# Patient Record
Sex: FEMALE | Race: WHITE | NOT HISPANIC OR LATINO | Employment: OTHER | URBAN - METROPOLITAN AREA
[De-identification: names, ages, dates, MRNs, and addresses within clinical notes are randomized per-mention and may not be internally consistent; named-entity substitution may affect disease eponyms.]

---

## 2017-08-21 ENCOUNTER — ANESTHESIA EVENT (OUTPATIENT)
Dept: GASTROENTEROLOGY | Facility: AMBULARY SURGERY CENTER | Age: 67
End: 2017-08-21
Payer: COMMERCIAL

## 2017-08-21 RX ORDER — BISOPROLOL FUMARATE AND HYDROCHLOROTHIAZIDE 2.5; 6.25 MG/1; MG/1
1 TABLET ORAL EVERY MORNING
COMMUNITY

## 2017-08-21 RX ORDER — ALPRAZOLAM 1 MG/1
TABLET ORAL
COMMUNITY

## 2017-08-21 RX ORDER — MELOXICAM 15 MG/1
15 TABLET ORAL EVERY MORNING
COMMUNITY

## 2017-08-21 RX ORDER — OMEPRAZOLE 40 MG/1
40 CAPSULE, DELAYED RELEASE ORAL EVERY MORNING
COMMUNITY

## 2017-08-22 ENCOUNTER — ANESTHESIA (OUTPATIENT)
Dept: GASTROENTEROLOGY | Facility: AMBULARY SURGERY CENTER | Age: 67
End: 2017-08-22
Payer: COMMERCIAL

## 2017-08-22 ENCOUNTER — HOSPITAL ENCOUNTER (OUTPATIENT)
Facility: AMBULARY SURGERY CENTER | Age: 67
Setting detail: OUTPATIENT SURGERY
Discharge: HOME | End: 2017-08-22
Attending: INTERNAL MEDICINE | Admitting: INTERNAL MEDICINE
Payer: COMMERCIAL

## 2017-08-22 VITALS
HEIGHT: 62 IN | WEIGHT: 180 LBS | OXYGEN SATURATION: 100 % | DIASTOLIC BLOOD PRESSURE: 70 MMHG | SYSTOLIC BLOOD PRESSURE: 150 MMHG | TEMPERATURE: 98.4 F | RESPIRATION RATE: 18 BRPM | BODY MASS INDEX: 33.13 KG/M2 | HEART RATE: 66 BPM

## 2017-08-22 DIAGNOSIS — K21.9 GASTRO-ESOPHAGEAL REFLUX DISEASE WITHOUT ESOPHAGITIS: ICD-10-CM

## 2017-08-22 DIAGNOSIS — K62.5 HEMORRHAGE OF ANUS AND RECTUM: ICD-10-CM

## 2017-08-22 DIAGNOSIS — Z80.0 FAMILY HISTORY OF MALIGNANT NEOPLASM OF DIGESTIVE ORGAN: ICD-10-CM

## 2017-08-22 DIAGNOSIS — R13.10 DYSPHAGIA: ICD-10-CM

## 2017-08-22 PROCEDURE — 88313 SPECIAL STAINS GROUP 2: CPT | Performed by: INTERNAL MEDICINE

## 2017-08-22 PROCEDURE — 88342 IMHCHEM/IMCYTCHM 1ST ANTB: CPT | Performed by: INTERNAL MEDICINE

## 2017-08-22 PROCEDURE — 88305 TISSUE EXAM BY PATHOLOGIST: CPT | Performed by: INTERNAL MEDICINE

## 2017-08-22 RX ORDER — SODIUM CHLORIDE, SODIUM LACTATE, POTASSIUM CHLORIDE, CALCIUM CHLORIDE 600; 310; 30; 20 MG/100ML; MG/100ML; MG/100ML; MG/100ML
INJECTION, SOLUTION INTRAVENOUS CONTINUOUS PRN
Status: DISCONTINUED | OUTPATIENT
Start: 2017-08-22 | End: 2017-08-22 | Stop reason: SURG

## 2017-08-22 RX ORDER — CLOTRIMAZOLE AND BETAMETHASONE DIPROPIONATE 10; .64 MG/G; MG/G
1 CREAM TOPICAL 2 TIMES DAILY
Status: DISCONTINUED | OUTPATIENT
Start: 2017-08-22 | End: 2017-08-22 | Stop reason: HOSPADM

## 2017-08-22 RX ORDER — PROPOFOL 10 MG/ML
INJECTION, EMULSION INTRAVENOUS CONTINUOUS PRN
Status: DISCONTINUED | OUTPATIENT
Start: 2017-08-22 | End: 2017-08-22 | Stop reason: SURG

## 2017-08-22 RX ORDER — GLYCOPYRROLATE 0.2 MG/ML
INJECTION INTRAMUSCULAR; INTRAVENOUS AS NEEDED
Status: DISCONTINUED | OUTPATIENT
Start: 2017-08-22 | End: 2017-08-22 | Stop reason: SURG

## 2017-08-22 RX ORDER — SODIUM CHLORIDE, SODIUM LACTATE, POTASSIUM CHLORIDE, CALCIUM CHLORIDE 600; 310; 30; 20 MG/100ML; MG/100ML; MG/100ML; MG/100ML
125 INJECTION, SOLUTION INTRAVENOUS CONTINUOUS
Status: DISCONTINUED | OUTPATIENT
Start: 2017-08-22 | End: 2017-08-22 | Stop reason: HOSPADM

## 2017-08-22 RX ORDER — PROPOFOL 10 MG/ML
INJECTION, EMULSION INTRAVENOUS AS NEEDED
Status: DISCONTINUED | OUTPATIENT
Start: 2017-08-22 | End: 2017-08-22 | Stop reason: SURG

## 2017-08-22 RX ADMIN — GLYCOPYRROLATE 0.1 MG: 0.2 INJECTION, SOLUTION INTRAMUSCULAR; INTRAVENOUS at 10:53

## 2017-08-22 RX ADMIN — LIDOCAINE HYDROCHLORIDE 100 MG: 20 INJECTION, SOLUTION INTRAVENOUS at 10:31

## 2017-08-22 RX ADMIN — PROPOFOL 40 MG: 10 INJECTION, EMULSION INTRAVENOUS at 10:32

## 2017-08-22 RX ADMIN — GLYCOPYRROLATE 0.1 MG: 0.2 INJECTION, SOLUTION INTRAMUSCULAR; INTRAVENOUS at 10:46

## 2017-08-22 RX ADMIN — PROPOFOL 60 MG: 10 INJECTION, EMULSION INTRAVENOUS at 10:31

## 2017-08-22 RX ADMIN — PROPOFOL 120 MCG/KG/MIN: 10 INJECTION, EMULSION INTRAVENOUS at 10:31

## 2017-08-22 RX ADMIN — SODIUM CHLORIDE, SODIUM LACTATE, POTASSIUM CHLORIDE, AND CALCIUM CHLORIDE: .6; .31; .03; .02 INJECTION, SOLUTION INTRAVENOUS at 10:27

## 2017-10-24 ENCOUNTER — ALLSCRIPTS OFFICE VISIT (OUTPATIENT)
Dept: OTHER | Facility: OTHER | Age: 67
End: 2017-10-24

## 2017-10-24 ENCOUNTER — GENERIC CONVERSION - ENCOUNTER (OUTPATIENT)
Dept: OTHER | Facility: OTHER | Age: 67
End: 2017-10-24

## 2017-10-24 DIAGNOSIS — Z12.31 ENCOUNTER FOR SCREENING MAMMOGRAM FOR MALIGNANT NEOPLASM OF BREAST: ICD-10-CM

## 2018-01-22 VITALS
WEIGHT: 181 LBS | SYSTOLIC BLOOD PRESSURE: 130 MMHG | DIASTOLIC BLOOD PRESSURE: 80 MMHG | HEIGHT: 62 IN | BODY MASS INDEX: 33.31 KG/M2

## 2021-02-13 DIAGNOSIS — Z23 ENCOUNTER FOR IMMUNIZATION: ICD-10-CM

## 2021-04-20 ENCOUNTER — OFFICE VISIT (OUTPATIENT)
Dept: GASTROENTEROLOGY | Facility: CLINIC | Age: 71
End: 2021-04-20
Payer: COMMERCIAL

## 2021-04-20 ENCOUNTER — TELEPHONE (OUTPATIENT)
Dept: GASTROENTEROLOGY | Facility: CLINIC | Age: 71
End: 2021-04-20

## 2021-04-20 VITALS
HEIGHT: 61 IN | SYSTOLIC BLOOD PRESSURE: 88 MMHG | DIASTOLIC BLOOD PRESSURE: 52 MMHG | HEART RATE: 73 BPM | BODY MASS INDEX: 33.04 KG/M2 | WEIGHT: 175 LBS

## 2021-04-20 DIAGNOSIS — K25.7 CHRONIC GASTRIC ULCER WITHOUT HEMORRHAGE AND WITHOUT PERFORATION: ICD-10-CM

## 2021-04-20 DIAGNOSIS — Z87.11 HISTORY OF GASTRIC ULCER: ICD-10-CM

## 2021-04-20 DIAGNOSIS — K59.01 SLOW TRANSIT CONSTIPATION: ICD-10-CM

## 2021-04-20 DIAGNOSIS — K64.9 HEMORRHOIDS, UNSPECIFIED HEMORRHOID TYPE: ICD-10-CM

## 2021-04-20 DIAGNOSIS — Z79.1 NSAID LONG-TERM USE: ICD-10-CM

## 2021-04-20 DIAGNOSIS — K62.5 RECTAL BLEEDING: Primary | ICD-10-CM

## 2021-04-20 DIAGNOSIS — Z86.010 HISTORY OF COLON POLYPS: ICD-10-CM

## 2021-04-20 PROCEDURE — 99244 OFF/OP CNSLTJ NEW/EST MOD 40: CPT | Performed by: INTERNAL MEDICINE

## 2021-04-20 RX ORDER — HYDROCORTISONE ACETATE 25 MG/1
25 SUPPOSITORY RECTAL 2 TIMES DAILY
Qty: 12 SUPPOSITORY | Refills: 0 | Status: SHIPPED | OUTPATIENT
Start: 2021-04-20

## 2021-04-20 RX ORDER — MECLIZINE HYDROCHLORIDE 25 MG/1
25 TABLET ORAL 3 TIMES DAILY PRN
COMMUNITY
Start: 2021-03-04

## 2021-04-20 RX ORDER — DOCUSATE SODIUM 100 MG/1
100 CAPSULE, LIQUID FILLED ORAL 2 TIMES DAILY
Qty: 10 CAPSULE | Refills: 0 | Status: SHIPPED | OUTPATIENT
Start: 2021-04-20

## 2021-04-20 RX ORDER — ATORVASTATIN CALCIUM 40 MG/1
40 TABLET, FILM COATED ORAL DAILY
COMMUNITY
Start: 2021-03-04

## 2021-04-20 NOTE — PATIENT INSTRUCTIONS
High Fiber Diet   AMBULATORY CARE:   A high-fiber diet  includes foods that have a high amount of fiber  Fiber is the part of fruits, vegetables, and grains that is not broken down by your body  Fiber keeps your bowel movements regular  Fiber can also help lower your cholesterol level, control blood sugar in people with diabetes, and relieve constipation  Fiber can also help you control your weight because it helps you feel full faster  Most adults should eat 25 to 35 grams of fiber each day  Talk to your dietitian or healthcare provider about the amount of fiber you need  Good sources of fiber:       · Foods with at least 4 grams of fiber per serving:      ? ? to ½ cup of high-fiber cereal (check the nutrition label on the box)    ? ½ cup of blackberries or raspberries    ? 4 dried prunes    ? 1 cooked artichoke    ? ½ cup of cooked legumes, such as lentils, or red, kidney, and sweeney beans    · Foods with 1 to 3 grams of fiber per serving:      ? 1 slice of whole-wheat, pumpernickel, or rye bread    ? ½ cup of cooked brown rice    ? 4 whole-wheat crackers    ? 1 cup of oatmeal    ? ½ cup of cereal with 1 to 3 grams of fiber per serving (check the nutrition label on the box)    ? 1 small piece of fruit, such as an apple, banana, pear, kiwi, or orange    ? 3 dates    ? ½ cup of canned apricots, fruit cocktail, peaches, or pears    ? ½ cup of raw or cooked vegetables, such as carrots, cauliflower, cabbage, spinach, squash, or corn  Ways that you can increase fiber in your diet:   · Choose brown or wild rice instead of white rice  · Use whole wheat flour in recipes instead of white or all-purpose flour  · Add beans and peas to casseroles or soups  · Choose fresh fruit and vegetables with peels or skins on instead of juices  Other diet guidelines to follow:   · Add fiber to your diet slowly  You may have abdominal discomfort, bloating, and gas if you add fiber to your diet too quickly       · Drink plenty of liquids as you add fiber to your diet  You may have nausea or develop constipation if you do not drink enough water  Ask how much liquid to drink each day and which liquids are best for you  © Copyright [address] Information is for End User's use only and may not be sold, redistributed or otherwise used for commercial purposes  All illustrations and images included in CareNotes® are the copyrighted property of A D A M , Inc  or [facility] Ofe Borges   The above information is an  only  It is not intended as medical advice for individual conditions or treatments  Talk to your doctor, nurse or pharmacist before following any medical regimen to see if it is safe and effective for you

## 2021-04-20 NOTE — PROGRESS NOTES
[facility] - Outpatient Consultation  James Hu 78 y o  female MRN: [id number]  Encounter: [id number]          ASSESSMENT AND PLAN:      1  Rectal bleeding   patient is having intermittent bright red bleeding per rectum  She is also constipated  She has some discomfort in the anal area  She denies any mucus per rectum  She denies any changes in  Her eating pattern  Her diet is poor in fiber  I have given her advised to eat high-fiber diet  Her bleeding is most likely from anorectal area from hemorrhoid  Her last colonoscopy was four years ago  Patient has history of adenomatous colon polyp  Patient advised colonoscopy for evaluation however she does not want to get that done and wants to try to get treated for hemorrhoidal of disease  Hemorrhoidal cream and stool softener has been prescribed  She will come back in 6-8 weeks  At that time hemorrhoidal banding will be considered  Colonoscopy will also be considered  - docusate sodium (COLACE) 100 mg capsule; Take 1 capsule (100 mg total) by mouth 2 (two) times a day  Dispense: 10 capsule; Refill: 0  - hydrocortisone (ANUSOL-HC) 25 mg suppository; Insert 1 suppository (25 mg total) into the rectum 2 (two) times a day  Dispense: 12 suppository; Refill: 0    2  Slow transit constipation    Patient appears to have significant constipation with change in bowel habits she denies any narcotic use age  She does not have any dietary changes recently  She does not want to get colonoscopy at this time  I will order docusate sodium  She will return in 6-8 weeks  - docusate sodium (COLACE) 100 mg capsule; Take 1 capsule (100 mg total) by mouth 2 (two) times a day  Dispense: 10 capsule; Refill: 0  - hydrocortisone (ANUSOL-HC) 25 mg suppository; Insert 1 suppository (25 mg total) into the rectum 2 (two) times a day  Dispense: 12 suppository; Refill: 0    3  History of colon polyps    Patient has history of colon polyp  Last colonoscopy four years ago with adenomatous polyp  She also has history of gastric ulcer  Patient takes nonsteroidal anti-inflammatory drugs on a off and on basis  Currently she is taking proton pump inhibitor  I have advised her to try to decrease the intake of NSAIDs and PPI  4  Chronic gastric ulcer without hemorrhage and without perforation    See above discussion  She has history of gastric ulcer diagnosed during last endoscopy four years ago  She has not had any upper endoscopy done since then  I have advised patient to undergo upper endoscopy  She does not want to get endoscopic evaluation at this time  She will see me again in 6-8 weeks  At that time will also does discuss the same  5  History of gastric ulcer    History of gastric ulcer  See above discussion    6  NSAID long-term use   see above discussion  Patient advised not to use NSAIDs  She can take Tylenol  She has history of generalized muscle aches and pain  I have advised to follow with her primary care  She may get evaluation for possible fibromyalgia  7  Hemorrhoids, unspecified hemorrhoid type    Patient will return in six weeks  Hemorrhoidal cream prescribed  Anoscopy and hemorrhoidal banding will be considered  ______________________________________________________________________    HPI:    Constipation and rectal bleeding  There is no abdominal pain or discomfort  Some rectal discomfort has been present  She does not have any significant reflux  She denies upper abdominal pain  REVIEW OF SYSTEMS:    CONSTITUTIONAL: Denies any fever, chills, rigors, and weight loss  HEENT: No earache or tinnitus  Denies hearing loss or visual disturbances  CARDIOVASCULAR: No chest pain or palpitations  RESPIRATORY: Denies any cough, hemoptysis, shortness of breath or dyspnea on exertion  GASTROINTESTINAL: As noted in the History of Present Illness  GENITOURINARY: No problems with urination   Denies any hematuria or dysuria  NEUROLOGIC: No dizziness or vertigo, denies headaches  MUSCULOSKELETAL: Denies any muscle or joint pain  SKIN: Denies skin rashes or itching  ENDOCRINE: Denies excessive thirst  Denies intolerance to heat or cold  PSYCHOSOCIAL: Denies depression or anxiety  Denies any recent memory loss  Historical Information   Past Medical History:   Diagnosis Date    A-fib [facility])     since epidural DCed pts been ok    Anxiety     GERD (gastroesophageal reflux disease)     Hyperlipidemia     Hypertension      Past Surgical History:   Procedure Laterality Date    BREAST SURGERY Left     bx benign    COLONOSCOPY      DILATION AND CURETTAGE OF UTERUS      JOINT REPLACEMENT Bilateral 2015    knees    [place] COLSC FLX W/RMVL OF TUMOR POLYP LESION SNARE TQ N/A 8/22/2017    Procedure: COLONOSCOPY and biopsy ;  Surgeon: Javier Schroeder MD;  Location: [facility] GI LAB; Service: Gastroenterology    [place] EGD TRANSORAL BIOPSY SINGLE/MULTIPLE N/A 8/22/2017    Procedure: ESOPHAGOGASTRODUODENOSCOPY (EGD) and biopsy ;  Surgeon: Javier Schroeder MD;  Location: [facility] GI LAB;   Service: Gastroenterology    REPLACEMENT TOTAL KNEE BILATERAL      TONSILLECTOMY      TUBAL LIGATION  1984     Social History   Social History     Substance and Sexual Activity   Alcohol Use Yes    Comment: socially     Social History     Substance and Sexual Activity   Drug Use No     Social History     Tobacco Use   Smoking Status Never Smoker   Smokeless Tobacco Never Used     Family History   Problem Relation Age of Onset    Cancer Mother         melanoma    Heart disease Father     Cancer Brother         pancreatic    No Known Problems Daughter     No Known Problems Son     Cancer Maternal Grandfather     No Known Problems Brother        Meds/Allergies       Current Outpatient Medications:     ALPRAZolam (XANAX) 1 mg tablet    aspirin (ECOTRIN) 325 mg EC tablet    atorvastatin (LIPITOR) 40 mg tablet   bisoprolol-hydrochlorothiazide (ZIAC) 2 5-6 25 MG per tablet    meclizine (ANTIVERT) 25 mg tablet    omeprazole (PriLOSEC) 40 MG capsule    docusate sodium (COLACE) 100 mg capsule    hydrocortisone (ANUSOL-HC) 25 mg suppository    meloxicam (MOBIC) 15 mg tablet    Allergies   Allergen Reactions    Doxycycline GI Intolerance    Tramadol GI Intolerance     Dizzy    Other Rash     Cat gut suture           Objective     Blood pressure (!) 88/52, pulse 73, height 5' 1" (1 549 m), weight 79 4 kg (175 lb)  Body mass index is 33 07 kg/m²  PHYSICAL EXAM:      General Appearance:   Alert, cooperative, no distress   HEENT:   Normocephalic, atraumatic, anicteric      Neck:  Supple, symmetrical, trachea midline   Lungs:   Clear to auscultation bilaterally; no rales, rhonchi or wheezing; respirations unlabored    Heart[de-identified]   Regular rate and rhythm; no murmur, rub, or gallop  Abdomen:   Soft, non-tender, non-distended; normal bowel sounds; no masses, no organomegaly    Genitalia:   Deferred    Rectal:   Deferred    Extremities:  No cyanosis, clubbing or edema    Pulses:  2+ and symmetric    Skin:  No jaundice, rashes, or lesions    Lymph nodes:  No palpable cervical lymphadenopathy        Lab Results:   No visits with results within 1 Day(s) from this visit     Latest known visit with results is:   Admission on 08/22/2017, Discharged on 08/22/2017   Component Date Value    Case Report 08/22/2017                      Value:Surgical Pathology Report                         Case: [id number]                                   Authorizing Provider:  Jesse Dunbar MD      Collected:           08/22/2017 1036              Ordering Location:     [facility]   Received:            08/22/2017 [address] [facility]                                                                       Pathologist:           Francisco Ruiz MD Specimens:   A) - Stomach, antral cold bx shallow ulceration                                                     B) - Esophagus, lower esophagus cold bx 1 cm                                                        C) - Large Intestine, Cecum, cecal polyp cold bx                                                    D) - Large Intestine, Transverse Colon, proximal transverse polyp cold bx                  Final Diagnosis 08/22/2017                      Value: This result contains rich text formatting which cannot be displayed here   Note 08/22/2017                      Value: This result contains rich text formatting which cannot be displayed here   Additional Information 08/22/2017                      Value: This result contains rich text formatting which cannot be displayed here  [place] Description 08/22/2017                      Value: This result contains rich text formatting which cannot be displayed here   Clinical Information 08/22/2017                      Value:Family history of malignant neoplasm of digestive organ  Hemorrhage of anus and rectum  Gastro-esophageal reflux disease without esophagitis  Dysphagia  Radiology Results:   No results found

## 2021-04-21 NOTE — TELEPHONE ENCOUNTER
LMOM for Pt to return call to schedule appt  Will call again in 1 week if we do not hear from her sooner

## 2021-04-22 NOTE — TELEPHONE ENCOUNTER
Spoke to pt whom was very concerned because at last appt she had she was not examined for hemorrhoid and wanted this taken care of prior to going on vacation  I offered her numerous appt with Dr Odilia Hassan, however, she could not do due to her work schedule  Pt needed a Wed  I scheduled her with Dr Kaykay Liu stating in ov note to please do rectal exam and band if needed

## 2021-04-22 NOTE — TELEPHONE ENCOUNTER
Received message from pt informing we keep missing each other  I returned her call, lmom apologizing for playing phone tag and asked her to please call back

## 2021-05-12 ENCOUNTER — OFFICE VISIT (OUTPATIENT)
Dept: GASTROENTEROLOGY | Facility: CLINIC | Age: 71
End: 2021-05-12
Payer: COMMERCIAL

## 2021-05-12 VITALS
HEART RATE: 67 BPM | HEIGHT: 61 IN | DIASTOLIC BLOOD PRESSURE: 74 MMHG | SYSTOLIC BLOOD PRESSURE: 128 MMHG | BODY MASS INDEX: 33.23 KG/M2 | WEIGHT: 176 LBS

## 2021-05-12 DIAGNOSIS — K64.9 HEMORRHOIDS, UNSPECIFIED HEMORRHOID TYPE: ICD-10-CM

## 2021-05-12 DIAGNOSIS — K62.5 RECTAL BLEEDING: Primary | ICD-10-CM

## 2021-05-12 PROCEDURE — 99213 OFFICE O/P EST LOW 20 MIN: CPT | Performed by: INTERNAL MEDICINE

## 2021-05-12 PROCEDURE — 46600 DIAGNOSTIC ANOSCOPY SPX: CPT | Performed by: INTERNAL MEDICINE

## 2021-05-12 NOTE — PROGRESS NOTES
[facility] - Outpatient Follow-up Note  Vinnie Bustos 78 y o  female MRN: [id number]  Encounter: [id number]          ASSESSMENT AND PLAN:      1  Rectal bleeding   likely hemorrhoidal though cannot be certain of this  Again recommended colonoscopy and she is not interested  2  Hemorrhoids, unspecified hemorrhoid type    Discussed options including hemorrhoidal banding  As her symptoms have improved at this time will plan to maximize dietary fiber intake and she will contact this office if bleeding returns, to schedule banding      Lower Endoscopy    Date/Time: 5/12/2021 1:57 PM  Performed by: Scott Peters MD  Authorized by: Scott Peters MD     Verbal consent obtained?: Yes    Risks and benefits: Risks, benefits and alternatives were discussed    Consent given by:  Patient  Patient states understanding of procedure being performed: Yes    Patient identity confirmed:  Verbally with patient  Indications: hemorrhoids and rectal bleeding    Patient sedated: No    Scope type: Anoscope  External exam performed: Yes    Pilonidal tenderness: No    Perianal skin tags: Yes    Perirectal warts: No    External hemorrhoids: No    Digital exam performed: Yes    Laxity of anal sphincter: No    Internal hemorrhoids: Yes    Prolapsed: No    Patient tolerance:  Patient tolerated the procedure well with no immediate complications        ______________________________________________________________________    SUBJECTIVE:    Patient returns in follow-up of rectal bleeding  Her bleeding seems to have significantly diminished over the past week or so    She did not obtain the prescription prescribed by Dr Patti Rosales:    ROS       Historical Information   Past Medical History:   Diagnosis Date    A-fib [facility])     since epidural DCed pts been ok    Anxiety     GERD (gastroesophageal reflux disease)     Hyperlipidemia     Hypertension      Past Surgical History: Procedure Laterality Date    BREAST SURGERY Left     bx benign    COLONOSCOPY      DILATION AND CURETTAGE OF UTERUS      JOINT REPLACEMENT Bilateral 2015    knees    [place] COLSC FLX W/RMVL OF TUMOR POLYP LESION SNARE TQ N/A 8/22/2017    Procedure: COLONOSCOPY and biopsy ;  Surgeon: Ros Arrieta MD;  Location: [facility] GI LAB; Service: Gastroenterology    [place] EGD TRANSORAL BIOPSY SINGLE/MULTIPLE N/A 8/22/2017    Procedure: ESOPHAGOGASTRODUODENOSCOPY (EGD) and biopsy ;  Surgeon: Ros Arrieta MD;  Location: [facility] GI LAB; Service: Gastroenterology    REPLACEMENT TOTAL KNEE BILATERAL      TONSILLECTOMY      TUBAL LIGATION  1984     Social History   Social History     Substance and Sexual Activity   Alcohol Use Yes    Comment: socially     Social History     Substance and Sexual Activity   Drug Use No     Social History     Tobacco Use   Smoking Status Never Smoker   Smokeless Tobacco Never Used     Family History   Problem Relation Age of Onset    Cancer Mother         melanoma    Heart disease Father     Cancer Brother         pancreatic    No Known Problems Daughter     No Known Problems Son     Cancer Maternal Grandfather     No Known Problems Brother        Meds/Allergies       Current Outpatient Medications:     ALPRAZolam (XANAX) 1 mg tablet    aspirin (ECOTRIN) 325 mg EC tablet    atorvastatin (LIPITOR) 40 mg tablet    bisoprolol-hydrochlorothiazide (ZIAC) 2 5-6 25 MG per tablet    docusate sodium (COLACE) 100 mg capsule    meclizine (ANTIVERT) 25 mg tablet    omeprazole (PriLOSEC) 40 MG capsule    hydrocortisone (ANUSOL-HC) 25 mg suppository    meloxicam (MOBIC) 15 mg tablet    Allergies   Allergen Reactions    Doxycycline GI Intolerance    Tramadol GI Intolerance     Dizzy    Other Rash     Cat gut suture           Objective     Blood pressure 128/74, pulse 67, height 5' 1" (1 549 m), weight 79 8 kg (176 lb)  Body mass index is 33 25 kg/m²        PHYSICAL EXAM:      Physical Exam     Lab Results:   No visits with results within 1 Day(s) from this visit  Latest known visit with results is:   Admission on 08/22/2017, Discharged on 08/22/2017   Component Date Value    Case Report 08/22/2017                      Value:Surgical Pathology Report                         Case: [id number]                                   Authorizing Provider:  Noel Frazier MD      Collected:           08/22/2017 1036              Ordering Location:     [facility]   Received:            08/22/2017 [address]                                                                       Pathologist:           Renée Horn MD                                                                Specimens:   A) - Stomach, antral cold bx shallow ulceration                                                     B) - Esophagus, lower esophagus cold bx 1 cm                                                        C) - Large Intestine, Cecum, cecal polyp cold bx                                                    D) - Large Intestine, Transverse Colon, proximal transverse polyp cold bx                  Final Diagnosis 08/22/2017                      Value: This result contains rich text formatting which cannot be displayed here   Note 08/22/2017                      Value: This result contains rich text formatting which cannot be displayed here   Additional Information 08/22/2017                      Value: This result contains rich text formatting which cannot be displayed here  [place] Description 08/22/2017                      Value: This result contains rich text formatting which cannot be displayed here   Clinical Information 08/22/2017                      Value:Family history of malignant neoplasm of digestive organ  Hemorrhage of anus and rectum  Gastro-esophageal reflux disease without esophagitis  Dysphagia  Radiology Results:   No results found

## 2021-09-23 ENCOUNTER — OFFICE VISIT (OUTPATIENT)
Dept: OBGYN CLINIC | Facility: CLINIC | Age: 71
End: 2021-09-23
Payer: COMMERCIAL

## 2021-09-23 VITALS — DIASTOLIC BLOOD PRESSURE: 82 MMHG | SYSTOLIC BLOOD PRESSURE: 126 MMHG | WEIGHT: 177.8 LBS | BODY MASS INDEX: 33.6 KG/M2

## 2021-09-23 DIAGNOSIS — Z12.31 ENCOUNTER FOR SCREENING MAMMOGRAM FOR MALIGNANT NEOPLASM OF BREAST: ICD-10-CM

## 2021-09-23 DIAGNOSIS — Z01.419 ENCOUNTER FOR GYNECOLOGICAL EXAMINATION (GENERAL) (ROUTINE) WITHOUT ABNORMAL FINDINGS: Primary | ICD-10-CM

## 2021-09-23 DIAGNOSIS — T14.8XXA BRUISING: ICD-10-CM

## 2021-09-23 PROCEDURE — S0610 ANNUAL GYNECOLOGICAL EXAMINA: HCPCS | Performed by: STUDENT IN AN ORGANIZED HEALTH CARE EDUCATION/TRAINING PROGRAM

## 2021-09-23 NOTE — PROGRESS NOTES
Dolores Christopher   1950    CC:  Yearly exam    A:  Yearly exam      Problem List Items Addressed This Visit     None      Visit Diagnoses     Encounter for gynecological examination (general) (routine) without abnormal findings    -  Primary    Encounter for screening mammogram for malignant neoplasm of breast        Relevant Orders    Mammo screening bilateral w 3d & cad    Bruising        Relevant Orders    CBC and differential          P:   Pap testing complete  We reviewed ASCCP guidelines for Pap testing  She is unsure if she feels comfortable with this, will continue to address  Mammo slip given   Colonoscopy up to date   Bruising: no anticoagulation, no recent CBC on file  She has appt for follow up regarding presumed right LE schwannoma and also with primary care, so CBC ordered, but she will wait until between these two appointments to complete her testing  RTO one year for yearly exam or sooner as needed  S:  78 y o  female here for yearly exam  She is postmenopausal and has had no vaginal bleeding  She denies vaginal discharge, itching, odor or dryness  Sexual activity: She is intermittently sexually active without pain, bleeding or dryness  Limited by busy schedule  STD testing: She does not want STD testing today       Menopausal    Last Pap: 10/2016 NILM/HPV neg, denies hx abnormal  Last Mammo: 2020 BIRADS 1  Last Colonoscopy: 2017 with adenomatous polyp, actively sees GI and has discussed follow up  Last DEXA: 2019 osteopenia    Non-smoker, social drinker  Exercises irregularly    Family hx of breast/ovarian/colon cancer: denies      Current Outpatient Medications:     ALPRAZolam (XANAX) 1 mg tablet, Take by mouth daily at bedtime as needed for anxiety, Disp: , Rfl:     aspirin (ECOTRIN) 325 mg EC tablet, Take 325 mg by mouth daily, Disp: , Rfl:     atorvastatin (LIPITOR) 40 mg tablet, Take 40 mg by mouth daily, Disp: , Rfl:    bisoprolol-hydrochlorothiazide (ZIAC) 2 5-6 25 MG per tablet, Take 1 tablet by mouth every morning, Disp: , Rfl:     meclizine (ANTIVERT) 25 mg tablet, Take 25 mg by mouth Three times daily as needed, Disp: , Rfl:     meloxicam (MOBIC) 15 mg tablet, Take 15 mg by mouth every morning, Disp: , Rfl:     omeprazole (PriLOSEC) 40 MG capsule, Take 40 mg by mouth every morning, Disp: , Rfl:     docusate sodium (COLACE) 100 mg capsule, Take 1 capsule (100 mg total) by mouth 2 (two) times a day (Patient not taking: Reported on 9/23/2021), Disp: 10 capsule, Rfl: 0    hydrocortisone (ANUSOL-HC) 25 mg suppository, Insert 1 suppository (25 mg total) into the rectum 2 (two) times a day (Patient not taking: Reported on 5/12/2021), Disp: 12 suppository, Rfl: 0  Social History     Socioeconomic History    Marital status: /Civil Union     Spouse name: Not on file    Number of children: Not on file    Years of education: Not on file    Highest education level: Not on file   Occupational History    Not on file   Tobacco Use    Smoking status: Never Smoker    Smokeless tobacco: Never Used   Vaping Use    Vaping Use: Never used   Substance and Sexual Activity    Alcohol use: Yes     Comment: socially    Drug use: No    Sexual activity: Yes     Partners: Male   Other Topics Concern    Not on file   Social History Narrative    Not on file     Social Determinants of Health     Financial Resource Strain:     Difficulty of Paying Living Expenses:    Food Insecurity:     Worried About Running Out of Food in the Last Year:     [address] in the Last Year:    Transportation Needs:     Lack of Transportation (Medical):      Lack of Transportation (Non-Medical):    Physical Activity:     Days of Exercise per Week:     Minutes of Exercise per Session:    Stress:     Feeling of Stress :    Social Connections:     Frequency of Communication with Friends and Family:     Frequency of Social Gatherings with Friends and Family:     Attends Mosque Services:     Active Member of Clubs or Organizations:     Attends Club or Organization Meetings:     Marital Status:    Intimate Partner Violence:     Fear of Current or Ex-Partner:     Emotionally Abused:     Physically Abused:     Sexually Abused:      Family History   Problem Relation Age of Onset    Cancer Mother         melanoma    Heart disease Father     Cancer Brother         pancreatic    No Known Problems Daughter     No Known Problems Son     Cancer Maternal Grandfather     No Known Problems Brother      Past Medical History:   Diagnosis Date    A-fib ([facility] [address] )     since epidural DCed pts been ok    Anxiety     GERD (gastroesophageal reflux disease)     Hyperlipidemia     Hypertension         Review of Systems   Respiratory: Negative  Cardiovascular: Negative  Gastrointestinal: Negative for constipation and diarrhea  Genitourinary: Negative for difficulty urinating, pelvic pain, vaginal bleeding, vaginal discharge, itching or odor  O:  Blood pressure 126/82, weight 80 6 kg (177 lb 12 8 oz)  Patient appears well and is not in distress  Neck is supple without masses  Right arm with appx 6 x 4 x 2 cm firm mass overlying ulna c/w previously imaged likely swhannoma  Nontender to palpation, no overlying skin changes  Scattered purpura   Breasts are symmetrical without mass, tenderness, nipple discharge, skin changes or adenopathy  Abdomen is soft and nontender without masses  Large old ecchymosis in lower abdomen, transverse  Vulva atrophic without lesions or rashes  Urethral meatus and urethra are normal  Bladder is normal to palpation  Vagina is normal without discharge or bleeding  Cervix is normal without discharge or lesion  Uterus and adnexa are normal, mobile, nontender without palpable mass    Rectovaginal exam without nodularity/masses/visible blood on glove

## 2021-11-05 ENCOUNTER — TELEPHONE (OUTPATIENT)
Dept: OBGYN CLINIC | Facility: CLINIC | Age: 71
End: 2021-11-05

## 2021-11-05 NOTE — TELEPHONE ENCOUNTER
----- Message from Rahel Gee MD sent at 10/27/2021  2:52 PM EDT -----  Hi, could you please call Trinh Christopher to let her know that her CBC result was normal, thank you!

## 2021-11-09 ENCOUNTER — OFFICE VISIT (OUTPATIENT)
Dept: GASTROENTEROLOGY | Facility: CLINIC | Age: 71
End: 2021-11-09
Payer: COMMERCIAL

## 2021-11-09 VITALS
WEIGHT: 177 LBS | HEIGHT: 61 IN | HEART RATE: 91 BPM | BODY MASS INDEX: 33.42 KG/M2 | SYSTOLIC BLOOD PRESSURE: 138 MMHG | DIASTOLIC BLOOD PRESSURE: 77 MMHG

## 2021-11-09 DIAGNOSIS — K64.9 HEMORRHOIDS, UNSPECIFIED HEMORRHOID TYPE: Primary | ICD-10-CM

## 2021-11-09 DIAGNOSIS — K21.9 GASTROESOPHAGEAL REFLUX DISEASE WITHOUT ESOPHAGITIS: ICD-10-CM

## 2021-11-09 DIAGNOSIS — K62.5 RECTAL BLEEDING: ICD-10-CM

## 2021-11-09 PROCEDURE — 99213 OFFICE O/P EST LOW 20 MIN: CPT | Performed by: INTERNAL MEDICINE

## 2021-11-15 ENCOUNTER — OFFICE VISIT (OUTPATIENT)
Dept: OBGYN CLINIC | Facility: CLINIC | Age: 71
End: 2021-11-15
Payer: COMMERCIAL

## 2021-11-15 VITALS — DIASTOLIC BLOOD PRESSURE: 70 MMHG | BODY MASS INDEX: 32.91 KG/M2 | WEIGHT: 174.2 LBS | SYSTOLIC BLOOD PRESSURE: 128 MMHG

## 2021-11-15 DIAGNOSIS — Z12.4 CERVICAL CANCER SCREENING: Primary | ICD-10-CM

## 2021-11-15 DIAGNOSIS — B37.3 CANDIDAL VULVOVAGINITIS: ICD-10-CM

## 2021-11-15 PROCEDURE — G0145 SCR C/V CYTO,THINLAYER,RESCR: HCPCS | Performed by: CLINICAL NURSE SPECIALIST

## 2021-11-15 PROCEDURE — G0476 HPV COMBO ASSAY CA SCREEN: HCPCS | Performed by: CLINICAL NURSE SPECIALIST

## 2021-11-15 PROCEDURE — 99213 OFFICE O/P EST LOW 20 MIN: CPT | Performed by: CLINICAL NURSE SPECIALIST

## 2021-11-15 RX ORDER — CLOTRIMAZOLE AND BETAMETHASONE DIPROPIONATE 10; .64 MG/G; MG/G
CREAM TOPICAL 2 TIMES DAILY
Qty: 30 G | Refills: 0 | Status: SHIPPED | OUTPATIENT
Start: 2021-11-15

## 2021-11-16 LAB
HPV HR 12 DNA CVX QL NAA+PROBE: NEGATIVE
HPV16 DNA CVX QL NAA+PROBE: NEGATIVE
HPV18 DNA CVX QL NAA+PROBE: NEGATIVE

## 2021-11-19 LAB
LAB AP GYN PRIMARY INTERPRETATION: NORMAL
Lab: NORMAL

## 2021-12-08 ENCOUNTER — OFFICE VISIT (OUTPATIENT)
Dept: GASTROENTEROLOGY | Facility: CLINIC | Age: 71
End: 2021-12-08
Payer: COMMERCIAL

## 2021-12-08 VITALS
BODY MASS INDEX: 33.42 KG/M2 | SYSTOLIC BLOOD PRESSURE: 151 MMHG | WEIGHT: 177 LBS | DIASTOLIC BLOOD PRESSURE: 77 MMHG | HEIGHT: 61 IN | HEART RATE: 74 BPM

## 2021-12-08 DIAGNOSIS — K64.9 HEMORRHOIDS, UNSPECIFIED HEMORRHOID TYPE: Primary | ICD-10-CM

## 2021-12-08 PROCEDURE — 46221 LIGATION OF HEMORRHOID(S): CPT | Performed by: INTERNAL MEDICINE

## 2021-12-08 PROCEDURE — 99213 OFFICE O/P EST LOW 20 MIN: CPT | Performed by: INTERNAL MEDICINE

## 2022-01-19 ENCOUNTER — OFFICE VISIT (OUTPATIENT)
Dept: GASTROENTEROLOGY | Facility: CLINIC | Age: 72
End: 2022-01-19
Payer: COMMERCIAL

## 2022-01-19 VITALS
HEART RATE: 66 BPM | BODY MASS INDEX: 33.04 KG/M2 | HEIGHT: 61 IN | DIASTOLIC BLOOD PRESSURE: 75 MMHG | SYSTOLIC BLOOD PRESSURE: 141 MMHG | WEIGHT: 175 LBS

## 2022-01-19 DIAGNOSIS — Z86.010 HISTORY OF COLON POLYPS: ICD-10-CM

## 2022-01-19 DIAGNOSIS — K64.9 HEMORRHOIDS, UNSPECIFIED HEMORRHOID TYPE: Primary | ICD-10-CM

## 2022-01-19 DIAGNOSIS — K62.5 RECTAL BLEEDING: ICD-10-CM

## 2022-01-19 PROBLEM — Z86.0100 HISTORY OF COLON POLYPS: Status: ACTIVE | Noted: 2022-01-19

## 2022-01-19 PROCEDURE — 99213 OFFICE O/P EST LOW 20 MIN: CPT | Performed by: INTERNAL MEDICINE

## 2022-01-19 NOTE — PROGRESS NOTES
[facility] - Outpatient Follow-up Note  James Hu 73 y o  female MRN: [id number]  Encounter: [id number]          ASSESSMENT AND PLAN:      1  Hemorrhoids, unspecified hemorrhoid type  2  Rectal bleeding  Symptoms improved following banding x2  Persistent grade 2 hemorrhoids especially posteriorly  Will maximize dietary fiber intake and follow up in a few months to consider further banding  3  History of colon polyps  Wishes to defer colonoscopy    ______________________________________________________________________    SUBJECTIVE:  Symptoms of bleeding and anal leakage markedly improved after 1st banding with brief recurrence of symptoms following 2nd banding but again now with seeming resolution of symptoms  Reports her insurance is going to change in the next month and would like to hold off on further banding until that occurs  Asking for anoscopic re-evaluation  REVIEW OF SYSTEMS:    ROS       Historical Information   Past Medical History:   Diagnosis Date    A-fib ([address] )     since epidural DCed pts been ok    Anxiety     Colon polyp     GERD (gastroesophageal reflux disease)     Hemorrhoids     Hyperlipidemia     Hypertension      Past Surgical History:   Procedure Laterality Date    BREAST SURGERY Left     bx benign    COLONOSCOPY      DILATION AND CURETTAGE OF UTERUS      JOINT REPLACEMENT Bilateral 2015    knees    CT COLSC FLX W/RMVL OF TUMOR POLYP LESION SNARE TQ N/A 8/22/2017    Procedure: COLONOSCOPY and biopsy ;  Surgeon: Daron Quinn MD;  Location: [facility]; Service: Gastroenterology    CT EGD TRANSORAL BIOPSY SINGLE/MULTIPLE N/A 8/22/2017    Procedure: ESOPHAGOGASTRODUODENOSCOPY (EGD) and biopsy ;  Surgeon: Daron Quinn MD;  Location: [facility];   Service: Gastroenterology    REPLACEMENT TOTAL KNEE BILATERAL      TONSILLECTOMY      TUBAL LIGATION  1984    UPPER GASTROINTESTINAL ENDOSCOPY       Social History Social History     Substance and Sexual Activity   Alcohol Use Yes    Comment: socially     Social History     Substance and Sexual Activity   Drug Use No     Social History     Tobacco Use   Smoking Status Never Smoker   Smokeless Tobacco Never Used     Family History   Problem Relation Age of Onset    Cancer Mother         melanoma    Heart disease Father     Cancer Brother         pancreatic    No Known Problems Daughter     No Known Problems Son     Cancer Maternal Grandfather     No Known Problems Brother        Meds/Allergies       Current Outpatient Medications:     ALPRAZolam (XANAX) 1 mg tablet    atorvastatin (LIPITOR) 40 mg tablet    bisoprolol-hydrochlorothiazide (ZIAC) 2 5-6 25 MG per tablet    clotrimazole-betamethasone (LOTRISONE) 1-0 05 % cream    meclizine (ANTIVERT) 25 mg tablet    omeprazole (PriLOSEC) 40 MG capsule    aspirin (ECOTRIN) 325 mg EC tablet    docusate sodium (COLACE) 100 mg capsule    hydrocortisone (ANUSOL-HC) 25 mg suppository    meloxicam (MOBIC) 15 mg tablet    Allergies   Allergen Reactions    Doxycycline GI Intolerance    Tramadol GI Intolerance     Dizzy    Other Rash     Cat gut suture           Objective     Blood pressure 141/75, pulse 66, height 5' 1" (1 549 m), weight 79 4 kg (175 lb)  Body mass index is 33 07 kg/m²  PHYSICAL EXAM:      Physical Exam  Genitourinary:     Comments: Digital rectal exam with brown stool in the vault, no discomfort, no blood, no mass  Anoscopy performed with grade 2 hemorrhoids especially posteriorly R>L         Lab Results:   No visits with results within 1 Day(s) from this visit     Latest known visit with results is:   Office Visit on 11/15/2021   Component Date Value    Case Report 11/15/2021                      Value:Gynecologic Cytology Report                       Case: Emmie Andujar Provider:  LIVE Bird      Collected:           11/15/2021 1433 Ordering Location:     [address]  [place]:            11/15/2021 [address]                                                                    First Screen:          Tanya Oli, CT                                                         Specimen:    LIQUID-BASED PAP, SCREENING, Cervix                                                        Primary Interpretation 11/15/2021 Negative for intraepithelial lesion or malignancy     Specimen Adequacy 11/15/2021 Satisfactory for evaluation  Endocervical/transformation zone component present   Additional Information 11/15/2021                      Value: This result contains rich text formatting which cannot be displayed here   HPV Other HR 11/15/2021 Negative     HPV16 11/15/2021 Negative     HPV18 11/15/2021 Negative          Radiology Results:   No results found

## 2022-09-28 ENCOUNTER — ANNUAL EXAM (OUTPATIENT)
Dept: OBGYN CLINIC | Facility: CLINIC | Age: 72
End: 2022-09-28
Payer: COMMERCIAL

## 2022-09-28 VITALS
WEIGHT: 171.6 LBS | SYSTOLIC BLOOD PRESSURE: 124 MMHG | BODY MASS INDEX: 32.4 KG/M2 | HEIGHT: 61 IN | DIASTOLIC BLOOD PRESSURE: 72 MMHG

## 2022-09-28 DIAGNOSIS — Z01.419 ENCOUNTER FOR GYNECOLOGICAL EXAMINATION (GENERAL) (ROUTINE) WITHOUT ABNORMAL FINDINGS: Primary | ICD-10-CM

## 2022-09-28 DIAGNOSIS — Z12.31 ENCOUNTER FOR SCREENING MAMMOGRAM FOR MALIGNANT NEOPLASM OF BREAST: ICD-10-CM

## 2022-09-28 PROCEDURE — 99397 PER PM REEVAL EST PAT 65+ YR: CPT | Performed by: STUDENT IN AN ORGANIZED HEALTH CARE EDUCATION/TRAINING PROGRAM

## 2022-09-28 NOTE — PROGRESS NOTES
Wanda Christopher   1950    CC:  Yearly exam    A:  Yearly exam      Problem List Items Addressed This Visit    None     Visit Diagnoses     Encounter for gynecological examination (general) (routine) without abnormal findings    -  Primary    Encounter for screening mammogram for malignant neoplasm of breast        Relevant Orders    Mammo screening bilateral w 3d & cad          P:   Pap up to date and testing complete  We reviewed ASCCP guidelines for Pap testing in detail today  She is ok with this  Mammo ordered   Colonoscopy due   DEXA up to date    RTO one year for yearly exam or sooner as needed  S:  73 y o  female here for yearly exam  She is postmenopausal and has had no vaginal bleeding  She denies vaginal discharge, itching, odor or dryness  Sexual activity: She is sexually active a few times per year, limited by 's difficulty, but without pain, bleeding or dryness  STD testing: She does not want STD testing today       Menopausal    Last Pap: 2021 NILM/HPV -  Last Mammo: 2021 BIRADS 2  Last Colonoscopy: 2017 with adenomatous polyp   Last DEXA: 2022 osteopenia     Non-smoker, social drinker  Exercises irregularly    Family hx of breast cancer: denies  Family hx of ovarian cancer: denies  Family hx of colon cancer: denies      Current Outpatient Medications:     ALPRAZolam (XANAX) 1 mg tablet, Take by mouth daily at bedtime as needed for anxiety, Disp: , Rfl:     atorvastatin (LIPITOR) 40 mg tablet, Take 40 mg by mouth daily, Disp: , Rfl:     bisoprolol-hydrochlorothiazide (ZIAC) 2 5-6 25 MG per tablet, Take 1 tablet by mouth every morning, Disp: , Rfl:     clotrimazole-betamethasone (LOTRISONE) 1-0 05 % cream, Apply topically 2 (two) times a day, Disp: 30 g, Rfl: 0    meclizine (ANTIVERT) 25 mg tablet, Take 25 mg by mouth Three times daily as needed, Disp: , Rfl:     meloxicam (MOBIC) 15 mg tablet, Take 15 mg by mouth every morning, Disp: , Rfl:    omeprazole (PriLOSEC) 40 MG capsule, Take 40 mg by mouth every morning, Disp: , Rfl:     aspirin (ECOTRIN) 325 mg EC tablet, Take 325 mg by mouth daily (Patient not taking: No sig reported), Disp: , Rfl:     docusate sodium (COLACE) 100 mg capsule, Take 1 capsule (100 mg total) by mouth 2 (two) times a day (Patient not taking: No sig reported), Disp: 10 capsule, Rfl: 0    hydrocortisone (ANUSOL-HC) 25 mg suppository, Insert 1 suppository (25 mg total) into the rectum 2 (two) times a day (Patient not taking: No sig reported), Disp: 12 suppository, Rfl: 0  Social History     Socioeconomic History    Marital status: /Civil Union     Spouse name: Not on file    Number of children: Not on file    Years of education: Not on file    Highest education level: Not on file   Occupational History    Not on file   Tobacco Use    Smoking status: Never Smoker    Smokeless tobacco: Never Used   Vaping Use    Vaping Use: Never used   Substance and Sexual Activity    Alcohol use: Yes     Comment: socially    Drug use: No    Sexual activity: Yes     Partners: Male   Other Topics Concern    Not on file   Social History Narrative    Not on file     Social Determinants of Health     Financial Resource Strain: Not on file   Food Insecurity: Not on file   Transportation Needs: Not on file   Physical Activity: Not on file   Stress: Not on file   Social Connections: Not on file   Intimate Partner Violence: Not on file   Housing Stability: Not on file     Family History   Problem Relation Age of Onset    Cancer Mother         melanoma    Heart disease Father     Cancer Brother         pancreatic    No Known Problems Daughter     No Known Problems Son     Cancer Maternal Grandfather     No Known Problems Brother      Past Medical History:   Diagnosis Date    A-fib ([facility] )     since epidural DCed pts been ok    Anxiety     Colon polyp     GERD (gastroesophageal reflux disease)     Hemorrhoids     Hyperlipidemia  Hypertension         Review of Systems   Respiratory: Negative  Cardiovascular: Negative  Gastrointestinal: Negative for constipation and diarrhea  Genitourinary: Negative for difficulty urinating, pelvic pain, vaginal bleeding, vaginal discharge, itching or odor  O:  Blood pressure 124/72, height 5' 1" (1 549 m), weight 77 8 kg (171 lb 9 6 oz)  Patient appears well and is not in distress  Neck is supple without masses  Breasts are symmetrical without mass, tenderness, nipple discharge, skin changes or adenopathy  Abdomen is soft and nontender without masses  Vulva without lesions or rashes  Urethral meatus and urethra are normal  Bladder is normal to palpation  Vagina is normal without discharge or bleeding  Cervix is normal without discharge or lesion  Uterus and adnexa are normal, mobile, nontender without palpable mass    Rectovaginal exam without nodularity/masses/visible blood on glove

## 2022-10-25 ENCOUNTER — NEW PATIENT COMPREHENSIVE (OUTPATIENT)
Dept: URBAN - METROPOLITAN AREA CLINIC 6 | Facility: CLINIC | Age: 72
End: 2022-10-25

## 2022-10-25 DIAGNOSIS — H26.492: ICD-10-CM

## 2022-10-25 DIAGNOSIS — H04.123: ICD-10-CM

## 2022-10-25 DIAGNOSIS — Z96.1: ICD-10-CM

## 2022-10-25 DIAGNOSIS — H25.811: ICD-10-CM

## 2022-10-25 PROCEDURE — 92004 COMPRE OPH EXAM NEW PT 1/>: CPT

## 2022-10-25 ASSESSMENT — TONOMETRY
OS_IOP_MMHG: 12
OD_IOP_MMHG: 11

## 2022-10-25 ASSESSMENT — VISUAL ACUITY
OU_SC: J3-1
OD_GLARE: 20/200
OS_GLARE: 20/100-1
OD_SC: 20/50-1
OS_SC: 20/40-1

## 2023-01-20 ENCOUNTER — PRE-OP CATARACT MEASUREMENTS (OUTPATIENT)
Dept: URBAN - METROPOLITAN AREA CLINIC 6 | Facility: CLINIC | Age: 73
End: 2023-01-20

## 2023-01-20 DIAGNOSIS — Z96.1: ICD-10-CM

## 2023-01-20 DIAGNOSIS — H25.811: ICD-10-CM

## 2023-01-20 DIAGNOSIS — H04.123: ICD-10-CM

## 2023-01-20 DIAGNOSIS — H26.492: ICD-10-CM

## 2023-01-20 PROCEDURE — 92136 OPHTHALMIC BIOMETRY: CPT

## 2023-01-20 PROCEDURE — 92014 COMPRE OPH EXAM EST PT 1/>: CPT

## 2023-01-20 ASSESSMENT — VISUAL ACUITY
OD_GLARE: 20/200
OD_PH: 20/40+1
OD_SC: 20/50+2
OS_GLARE: 20/100
OU_SC: J3
OS_SC: 20/40

## 2023-01-20 ASSESSMENT — TONOMETRY
OD_IOP_MMHG: 11
OS_IOP_MMHG: 9

## 2024-01-30 ENCOUNTER — OFFICE VISIT (OUTPATIENT)
Age: 74
End: 2024-01-30

## 2024-01-30 VITALS — WEIGHT: 171 LBS | HEIGHT: 61 IN | BODY MASS INDEX: 32.28 KG/M2

## 2024-01-30 DIAGNOSIS — M54.50 CHRONIC BILATERAL LOW BACK PAIN WITHOUT SCIATICA: ICD-10-CM

## 2024-01-30 DIAGNOSIS — M54.2 NECK PAIN: ICD-10-CM

## 2024-01-30 DIAGNOSIS — M99.03 SEGMENTAL DYSFUNCTION OF LUMBAR REGION: ICD-10-CM

## 2024-01-30 DIAGNOSIS — M79.18 MYOFASCIAL PAIN: ICD-10-CM

## 2024-01-30 DIAGNOSIS — M99.05 SEGMENTAL DYSFUNCTION OF PELVIC REGION: Primary | ICD-10-CM

## 2024-01-30 DIAGNOSIS — M99.01 SEGMENTAL DYSFUNCTION OF CERVICAL REGION: ICD-10-CM

## 2024-01-30 DIAGNOSIS — M99.02 SEGMENTAL DYSFUNCTION OF THORACIC REGION: ICD-10-CM

## 2024-01-30 DIAGNOSIS — G89.29 CHRONIC BILATERAL LOW BACK PAIN WITHOUT SCIATICA: ICD-10-CM

## 2024-01-30 DIAGNOSIS — M99.04 SEGMENTAL DYSFUNCTION OF SACRAL REGION: ICD-10-CM

## 2024-01-30 PROCEDURE — 99202 OFFICE O/P NEW SF 15 MIN: CPT | Performed by: CHIROPRACTOR

## 2024-01-30 PROCEDURE — 98941 CHIROPRACT MANJ 3-4 REGIONS: CPT | Performed by: CHIROPRACTOR

## 2024-02-04 NOTE — PROGRESS NOTES
Date of First Visit: 1/30/2024  Visit number: 1    HPI:  Back Pain  This is a chronic problem. The current episode started more than 1 year ago. The problem occurs intermittently. The problem has been waxing and waning since onset. The pain is present in the lumbar spine and sacro-iliac. The quality of the pain is described as aching. The pain is moderate. The symptoms are aggravated by bending and twisting. She has tried home exercises and NSAIDs for the symptoms. The treatment provided mild relief.     Trinh Christopher is a 73 y.o. female who presents for evaluation of possible treatment of neck pain and stiffness along with low back pain radiating into both hips.  She reports no trauma or incident of causation.  Symptoms been waxing and waning on and off for quite some time.  Pain is aggravated by movement alleviated by rest and is described as constant achy feeling.    She reports that she tries to stay active on a daily basis doing some form of activity and stretching.  She denies any constitutional changes denies any bowel bladder changes is receiving restorative sleep.    Chief Complaint   Patient presents with   • Neck - Pain     Neck is slightly stiff . Pain score 5      • Back Pain     Lower lumbar pain that radiates down both hips. Patient states constant achy feeling. Pain score 5        Past Medical History:   Diagnosis Date   • A-fib ([facility])     since epidural DCed pts been ok   • Anxiety    • Colon polyp    • GERD (gastroesophageal reflux disease)    • Hemorrhoids    • Hyperlipidemia    • Hypertension       Past Surgical History:   Procedure Laterality Date   • BREAST SURGERY Left     bx benign   • COLONOSCOPY     • DILATION AND CURETTAGE OF UTERUS     • JOINT REPLACEMENT Bilateral 2015    knees   • ID COLSC FLX W/RMVL OF TUMOR POLYP LESION SNARE TQ N/A 8/22/2017    Procedure: COLONOSCOPY and biopsy.;  Surgeon: Tanmay Morgan MD;  Location: [facility];  Service: Gastroenterology   • ID EGD  TRANSORAL BIOPSY SINGLE/MULTIPLE N/A 8/22/2017    Procedure: ESOPHAGOGASTRODUODENOSCOPY (EGD) and biopsy.;  Surgeon: Tanmay Morgan MD;  Location: [facility];  Service: Gastroenterology   • REPLACEMENT TOTAL KNEE BILATERAL     • TONSILLECTOMY     • TUBAL LIGATION  1984   • UPPER GASTROINTESTINAL ENDOSCOPY       Family History   Problem Relation Age of Onset   • Cancer Mother         melanoma   • Heart disease Father    • Cancer Brother         pancreatic   • No Known Problems Daughter    • No Known Problems Son    • Cancer Maternal Grandfather    • No Known Problems Brother      Social History     Socioeconomic History   • Marital status: /Civil Union     Spouse name: None   • Number of children: None   • Years of education: None   • Highest education level: None   Occupational History   • None   Tobacco Use   • Smoking status: Never   • Smokeless tobacco: Never   Vaping Use   • Vaping status: Never Used   Substance and Sexual Activity   • Alcohol use: Yes     Comment: socially   • Drug use: No   • Sexual activity: Yes     Partners: Male   Other Topics Concern   • None   Social History Narrative   • None     Social Determinants of Health     Financial Resource Strain: Not on file   Food Insecurity: Not on file   Transportation Needs: Not on file   Physical Activity: Not on file   Stress: Not on file   Social Connections: Not on file   Intimate Partner Violence: Not on file   Housing Stability: Not on file       Current Outpatient Medications:   •  ALPRAZolam (XANAX) 1 mg tablet, Take by mouth daily at bedtime as needed for anxiety, Disp: , Rfl:   •  aspirin (ECOTRIN) 325 mg EC tablet, Take 325 mg by mouth daily (Patient not taking: No sig reported), Disp: , Rfl:   •  atorvastatin (LIPITOR) 40 mg tablet, Take 40 mg by mouth daily, Disp: , Rfl:   •  bisoprolol-hydrochlorothiazide (ZIAC) 2.5-6.25 MG per tablet, Take 1 tablet by mouth every morning, Disp: , Rfl:   •  clotrimazole-betamethasone (LOTRISONE)  1-0.05 % cream, Apply topically 2 (two) times a day, Disp: 30 g, Rfl: 0  •  docusate sodium (COLACE) 100 mg capsule, Take 1 capsule (100 mg total) by mouth 2 (two) times a day (Patient not taking: No sig reported), Disp: 10 capsule, Rfl: 0  •  hydrocortisone (ANUSOL-HC) 25 mg suppository, Insert 1 suppository (25 mg total) into the rectum 2 (two) times a day (Patient not taking: No sig reported), Disp: 12 suppository, Rfl: 0  •  meclizine (ANTIVERT) 25 mg tablet, Take 25 mg by mouth Three times daily as needed, Disp: , Rfl:   •  meloxicam (MOBIC) 15 mg tablet, Take 15 mg by mouth every morning, Disp: , Rfl:   •  omeprazole (PriLOSEC) 40 MG capsule, Take 40 mg by mouth every morning, Disp: , Rfl:   Allergies as of 01/30/2024 - Reviewed 01/30/2024   Allergen Reaction Noted   • Doxycycline GI Intolerance 08/21/2017   • Tramadol GI Intolerance 08/21/2017   • Other Rash 08/21/2017         The following portions of the patient's history were reviewed and updated as appropriate: allergies, current medications, past family history, past medical history, past social history, past surgical history, and problem list.    Review of Systems   Constitutional: Negative.    Musculoskeletal:  Positive for back pain.   Neurological: Negative.    Psychiatric/Behavioral: Negative.         Physical Exam:  Physical Exam  Vitals reviewed.   Constitutional:       Appearance: Normal appearance.   Cardiovascular:      Rate and Rhythm: Normal rate.      Pulses: Normal pulses.   Musculoskeletal:      Cervical back: Spasms and tenderness present. Pain with movement present. Decreased range of motion.      Thoracic back: Spasms and tenderness present. Decreased range of motion.      Lumbar back: Spasms and tenderness present. Decreased range of motion. Negative right straight leg raise test and negative left straight leg raise test.        Back:    Skin:     General: Skin is warm and dry.   Neurological:      General: No focal deficit present.       Mental Status: She is alert and oriented to person, place, and time.      Sensory: Sensation is intact.      Motor: Motor function is intact.      Gait: Gait is intact.      Deep Tendon Reflexes: Reflexes are normal and symmetric.   Psychiatric:         Mood and Affect: Mood normal.         Behavior: Behavior normal.         Thought Content: Thought content normal.         Assessment:  Diagnoses and all orders for this visit:    Segmental dysfunction of pelvic region    Chronic bilateral low back pain without sciatica    Segmental dysfunction of sacral region    Segmental dysfunction of lumbar region    Segmental dysfunction of cervical region    Segmental dysfunction of thoracic region    Neck pain    Myofascial pain         Treatment:  47386  Manipulation to the right innominate, sacrum, L5 via Rios drop maneuver well-tolerated.  MFR neck upper back.  Manipulation T4-T5.    Discussion:  I reviewed past medical history.  Discussed case in detail with the patient today.  Period of chiropractic care reassessment 4 weeks we will review home program make sure she is doing appropriate stretching and exercise.  No follow-ups on file.

## 2024-02-12 ENCOUNTER — PROCEDURE VISIT (OUTPATIENT)
Age: 74
End: 2024-02-12
Payer: MEDICARE

## 2024-02-12 VITALS
WEIGHT: 171 LBS | HEIGHT: 61 IN | DIASTOLIC BLOOD PRESSURE: 84 MMHG | SYSTOLIC BLOOD PRESSURE: 131 MMHG | BODY MASS INDEX: 32.28 KG/M2

## 2024-02-12 DIAGNOSIS — G89.29 CHRONIC BILATERAL LOW BACK PAIN WITHOUT SCIATICA: ICD-10-CM

## 2024-02-12 DIAGNOSIS — M99.03 SEGMENTAL DYSFUNCTION OF LUMBAR REGION: ICD-10-CM

## 2024-02-12 DIAGNOSIS — M99.01 SEGMENTAL DYSFUNCTION OF CERVICAL REGION: ICD-10-CM

## 2024-02-12 DIAGNOSIS — M54.50 CHRONIC BILATERAL LOW BACK PAIN WITHOUT SCIATICA: ICD-10-CM

## 2024-02-12 DIAGNOSIS — M99.05 SEGMENTAL DYSFUNCTION OF PELVIC REGION: Primary | ICD-10-CM

## 2024-02-12 DIAGNOSIS — M99.02 SEGMENTAL DYSFUNCTION OF THORACIC REGION: ICD-10-CM

## 2024-02-12 DIAGNOSIS — M99.04 SEGMENTAL DYSFUNCTION OF SACRAL REGION: ICD-10-CM

## 2024-02-12 DIAGNOSIS — M54.2 NECK PAIN: ICD-10-CM

## 2024-02-12 DIAGNOSIS — M79.18 MYOFASCIAL PAIN: ICD-10-CM

## 2024-02-12 PROCEDURE — 98941 CHIROPRACT MANJ 3-4 REGIONS: CPT | Performed by: CHIROPRACTOR

## 2024-02-20 NOTE — PROGRESS NOTES
Date of first visit: 1/30/2024      HPI:  Amie returns for treatment reports ongoing symptoms neck upper back and lower back.    VPAS  5-7  Exam reveals a pelvic obliquity elevated right versus left innominate leg with any quality biomechanically joint dysfunction present the right sacroiliac joint L5-S1 L4-5 motion units  The following portions of the patient's history were reviewed and updated as appropriate: allergies, current medications, past family history, past medical history, past social history, past surgical history, and problem list.    Review of Systems    Physical Exam:  Parathoracic hypertonicity noted with the T4-T5 C5-C6 C1-C2 segmental dysfunction cervical range of motion remains reduced.    Assessment:   Diagnosis ICD-10-CM Associated Orders   1. Segmental dysfunction of pelvic region  M99.05       2. Chronic bilateral low back pain without sciatica  M54.50     G89.29       3. Segmental dysfunction of sacral region  M99.04       4. Segmental dysfunction of lumbar region  M99.03       5. Segmental dysfunction of cervical region  M99.01       6. Segmental dysfunction of thoracic region  M99.02       7. Neck pain  M54.2       8. Myofascial pain  M79.18                 Treatment: 31881  Manipulation to the right innominate, sacrum, L5 L4 via Rios drop maneuver T4 C5 C1 via seated stairstepping technique well-tolerated.    Discussion:  Continue home stretching see her back for follow-up

## 2024-02-26 ENCOUNTER — PROCEDURE VISIT (OUTPATIENT)
Age: 74
End: 2024-02-26
Payer: MEDICARE

## 2024-02-26 VITALS — BODY MASS INDEX: 32.28 KG/M2 | HEIGHT: 61 IN | WEIGHT: 171 LBS

## 2024-02-26 DIAGNOSIS — M79.18 MYOFASCIAL PAIN: ICD-10-CM

## 2024-02-26 DIAGNOSIS — M99.02 SEGMENTAL DYSFUNCTION OF THORACIC REGION: ICD-10-CM

## 2024-02-26 DIAGNOSIS — M99.03 SEGMENTAL DYSFUNCTION OF LUMBAR REGION: ICD-10-CM

## 2024-02-26 DIAGNOSIS — M99.01 SEGMENTAL DYSFUNCTION OF CERVICAL REGION: ICD-10-CM

## 2024-02-26 DIAGNOSIS — M54.50 CHRONIC BILATERAL LOW BACK PAIN WITHOUT SCIATICA: ICD-10-CM

## 2024-02-26 DIAGNOSIS — G89.29 CHRONIC BILATERAL LOW BACK PAIN WITHOUT SCIATICA: ICD-10-CM

## 2024-02-26 DIAGNOSIS — M99.04 SEGMENTAL DYSFUNCTION OF SACRAL REGION: ICD-10-CM

## 2024-02-26 DIAGNOSIS — M54.2 NECK PAIN: ICD-10-CM

## 2024-02-26 DIAGNOSIS — M99.05 SEGMENTAL DYSFUNCTION OF PELVIC REGION: Primary | ICD-10-CM

## 2024-02-26 PROCEDURE — 98941 CHIROPRACT MANJ 3-4 REGIONS: CPT | Performed by: CHIROPRACTOR

## 2024-02-26 NOTE — PROGRESS NOTES
Date of first visit: 1/30/2024      HPI:  Amie returns for treatment reports ongoing symptoms neck upper back and lower back.    VPAS  7    Exam reveals a pelvic obliquity elevated right versus left innominate leg with any quality biomechanically joint dysfunction present the right sacroiliac joint L5-S1 L4-5 motion units  The following portions of the patient's history were reviewed and updated as appropriate: allergies, current medications, past family history, past medical history, past social history, past surgical history, and problem list.    Review of Systems    Physical Exam:  Parathoracic hypertonicity noted with the T4-T5 C5-C6 C1-C2 segmental dysfunction cervical range of motion remains reduced.    Assessment:   Diagnosis ICD-10-CM Associated Orders   1. Segmental dysfunction of pelvic region  M99.05       2. Chronic bilateral low back pain without sciatica  M54.50     G89.29       3. Segmental dysfunction of sacral region  M99.04       4. Segmental dysfunction of lumbar region  M99.03       5. Segmental dysfunction of cervical region  M99.01       6. Segmental dysfunction of thoracic region  M99.02       7. Neck pain  M54.2       8. Myofascial pain  M79.18                 Treatment: 24790  Manipulation to the right innominate, sacrum, L5 L4 via Rios drop maneuver T4 C5 C1 via seated stairstepping technique well-tolerated.    Discussion:  Continue home stretching see her back for follow-up

## 2024-03-04 ENCOUNTER — PROCEDURE VISIT (OUTPATIENT)
Age: 74
End: 2024-03-04
Payer: MEDICARE

## 2024-03-04 VITALS — SYSTOLIC BLOOD PRESSURE: 136 MMHG | DIASTOLIC BLOOD PRESSURE: 74 MMHG

## 2024-03-04 DIAGNOSIS — M99.05 SEGMENTAL DYSFUNCTION OF PELVIC REGION: Primary | ICD-10-CM

## 2024-03-04 DIAGNOSIS — M54.2 NECK PAIN: ICD-10-CM

## 2024-03-04 DIAGNOSIS — M54.50 CHRONIC BILATERAL LOW BACK PAIN WITHOUT SCIATICA: ICD-10-CM

## 2024-03-04 DIAGNOSIS — G89.29 CHRONIC BILATERAL LOW BACK PAIN WITHOUT SCIATICA: ICD-10-CM

## 2024-03-04 DIAGNOSIS — M99.04 SEGMENTAL DYSFUNCTION OF SACRAL REGION: ICD-10-CM

## 2024-03-04 DIAGNOSIS — M99.03 SEGMENTAL DYSFUNCTION OF LUMBAR REGION: ICD-10-CM

## 2024-03-04 DIAGNOSIS — M99.02 SEGMENTAL DYSFUNCTION OF THORACIC REGION: ICD-10-CM

## 2024-03-04 DIAGNOSIS — M99.01 SEGMENTAL DYSFUNCTION OF CERVICAL REGION: ICD-10-CM

## 2024-03-04 DIAGNOSIS — M79.18 MYOFASCIAL PAIN: ICD-10-CM

## 2024-03-04 PROCEDURE — 98941 CHIROPRACT MANJ 3-4 REGIONS: CPT | Performed by: CHIROPRACTOR

## 2024-03-04 NOTE — PROGRESS NOTES
Date of first visit: 1/30/2024      HPI:  Amie returns for treatment reports ongoing symptoms neck upper back and lower back.    VPAS  5-8    Exam reveals a pelvic obliquity elevated right versus left innominate leg with any quality biomechanically joint dysfunction present the right sacroiliac joint L5-S1 L4-5 motion units  The following portions of the patient's history were reviewed and updated as appropriate: allergies, current medications, past family history, past medical history, past social history, past surgical history, and problem list.    Review of Systems    Physical Exam:  Parathoracic hypertonicity noted with the T4-T5 C5-C6 C1-C2 segmental dysfunction cervical range of motion remains reduced.    Assessment:   Diagnosis ICD-10-CM Associated Orders   1. Segmental dysfunction of pelvic region  M99.05       2. Chronic bilateral low back pain without sciatica  M54.50     G89.29       3. Segmental dysfunction of sacral region  M99.04       4. Segmental dysfunction of lumbar region  M99.03       5. Segmental dysfunction of cervical region  M99.01       6. Segmental dysfunction of thoracic region  M99.02       7. Neck pain  M54.2       8. Myofascial pain  M79.18                 Treatment: 64960  Manipulation to the right innominate, sacrum, L5 L4 via Rios drop maneuver T4 C5 C1 via seated stairstepping technique well-tolerated.    Discussion:  Continue home stretching see her back for follow-up

## 2024-03-18 ENCOUNTER — PROCEDURE VISIT (OUTPATIENT)
Age: 74
End: 2024-03-18
Payer: MEDICARE

## 2024-03-18 VITALS
WEIGHT: 171 LBS | HEART RATE: 67 BPM | BODY MASS INDEX: 32.28 KG/M2 | DIASTOLIC BLOOD PRESSURE: 82 MMHG | SYSTOLIC BLOOD PRESSURE: 125 MMHG | HEIGHT: 61 IN

## 2024-03-18 DIAGNOSIS — M54.50 CHRONIC BILATERAL LOW BACK PAIN WITHOUT SCIATICA: ICD-10-CM

## 2024-03-18 DIAGNOSIS — M79.18 MYOFASCIAL PAIN: ICD-10-CM

## 2024-03-18 DIAGNOSIS — M99.02 SEGMENTAL DYSFUNCTION OF THORACIC REGION: ICD-10-CM

## 2024-03-18 DIAGNOSIS — M99.01 SEGMENTAL DYSFUNCTION OF CERVICAL REGION: ICD-10-CM

## 2024-03-18 DIAGNOSIS — M99.05 SEGMENTAL DYSFUNCTION OF PELVIC REGION: Primary | ICD-10-CM

## 2024-03-18 DIAGNOSIS — G89.29 CHRONIC BILATERAL LOW BACK PAIN WITHOUT SCIATICA: ICD-10-CM

## 2024-03-18 DIAGNOSIS — M99.04 SEGMENTAL DYSFUNCTION OF SACRAL REGION: ICD-10-CM

## 2024-03-18 DIAGNOSIS — M54.2 NECK PAIN: ICD-10-CM

## 2024-03-18 DIAGNOSIS — M99.03 SEGMENTAL DYSFUNCTION OF LUMBAR REGION: ICD-10-CM

## 2024-03-18 PROCEDURE — 98941 CHIROPRACT MANJ 3-4 REGIONS: CPT | Performed by: CHIROPRACTOR

## 2024-03-18 NOTE — PROGRESS NOTES
Date of first visit: 1/30/2024      HPI:  Amie returns for treatment reports ongoing symptoms neck upper back and lower back.    VPAS  6-9    Exam reveals a pelvic obliquity elevated right versus left innominate leg with any quality biomechanically joint dysfunction present the right sacroiliac joint L5-S1 L4-5 motion units  The following portions of the patient's history were reviewed and updated as appropriate: allergies, current medications, past family history, past medical history, past social history, past surgical history, and problem list.    Review of Systems    Physical Exam:  Parathoracic hypertonicity noted with the T4-T5 C5-C6 C1-C2 segmental dysfunction cervical range of motion remains reduced.    Assessment:   Diagnosis ICD-10-CM Associated Orders   1. Segmental dysfunction of pelvic region  M99.05       2. Chronic bilateral low back pain without sciatica  M54.50     G89.29       3. Segmental dysfunction of sacral region  M99.04       4. Segmental dysfunction of lumbar region  M99.03       5. Segmental dysfunction of cervical region  M99.01       6. Segmental dysfunction of thoracic region  M99.02       7. Neck pain  M54.2       8. Myofascial pain  M79.18                 Treatment: 44036  Manipulation to the right innominate, sacrum, L5 L4 via Rios drop maneuver T4 C5 C1 via seated stairstepping technique well-tolerated.    Discussion:  Continue home stretching see her back for follow-up

## 2024-03-26 ENCOUNTER — PROCEDURE VISIT (OUTPATIENT)
Age: 74
End: 2024-03-26
Payer: MEDICARE

## 2024-03-26 VITALS
HEIGHT: 61 IN | SYSTOLIC BLOOD PRESSURE: 113 MMHG | DIASTOLIC BLOOD PRESSURE: 71 MMHG | WEIGHT: 171 LBS | HEART RATE: 70 BPM | BODY MASS INDEX: 32.28 KG/M2

## 2024-03-26 DIAGNOSIS — M99.03 SEGMENTAL DYSFUNCTION OF LUMBAR REGION: ICD-10-CM

## 2024-03-26 DIAGNOSIS — M79.18 MYOFASCIAL PAIN: ICD-10-CM

## 2024-03-26 DIAGNOSIS — M99.05 SEGMENTAL DYSFUNCTION OF PELVIC REGION: Primary | ICD-10-CM

## 2024-03-26 DIAGNOSIS — M54.50 CHRONIC BILATERAL LOW BACK PAIN WITHOUT SCIATICA: ICD-10-CM

## 2024-03-26 DIAGNOSIS — M54.2 NECK PAIN: ICD-10-CM

## 2024-03-26 DIAGNOSIS — G89.29 CHRONIC BILATERAL LOW BACK PAIN WITHOUT SCIATICA: ICD-10-CM

## 2024-03-26 DIAGNOSIS — M99.01 SEGMENTAL DYSFUNCTION OF CERVICAL REGION: ICD-10-CM

## 2024-03-26 DIAGNOSIS — M99.02 SEGMENTAL DYSFUNCTION OF THORACIC REGION: ICD-10-CM

## 2024-03-26 DIAGNOSIS — M99.04 SEGMENTAL DYSFUNCTION OF SACRAL REGION: ICD-10-CM

## 2024-03-26 PROCEDURE — 98941 CHIROPRACT MANJ 3-4 REGIONS: CPT | Performed by: CHIROPRACTOR

## 2024-03-26 NOTE — PROGRESS NOTES
Date of first visit: 1/30/2024      HPI:  Amie returns for treatment reports ongoing symptoms neck upper back and lower back.    VPAS  7    Exam reveals a pelvic obliquity elevated right versus left innominate leg with any quality biomechanically joint dysfunction present the right sacroiliac joint L5-S1 L4-5 motion units  The following portions of the patient's history were reviewed and updated as appropriate: allergies, current medications, past family history, past medical history, past social history, past surgical history, and problem list.    Review of Systems    Physical Exam:  Parathoracic hypertonicity noted with the T4-T5 C5-C6 C1-C2 segmental dysfunction cervical range of motion remains reduced.    Assessment:   Diagnosis ICD-10-CM Associated Orders   1. Segmental dysfunction of pelvic region  M99.05       2. Chronic bilateral low back pain without sciatica  M54.50     G89.29       3. Segmental dysfunction of sacral region  M99.04       4. Segmental dysfunction of lumbar region  M99.03       5. Segmental dysfunction of cervical region  M99.01       6. Segmental dysfunction of thoracic region  M99.02       7. Neck pain  M54.2       8. Myofascial pain  M79.18                 Treatment: 80623  Manipulation to the right innominate, sacrum, L5 L4 via Rios drop maneuver T4 C5 C1 via seated stairstepping technique well-tolerated.Pretreat US neck.    Discussion:  Continue home stretching see her back for follow-up

## 2024-04-02 ENCOUNTER — PROCEDURE VISIT (OUTPATIENT)
Age: 74
End: 2024-04-02
Payer: MEDICARE

## 2024-04-02 VITALS
WEIGHT: 171 LBS | DIASTOLIC BLOOD PRESSURE: 65 MMHG | SYSTOLIC BLOOD PRESSURE: 111 MMHG | HEART RATE: 74 BPM | HEIGHT: 61 IN | BODY MASS INDEX: 32.28 KG/M2

## 2024-04-02 DIAGNOSIS — M99.03 SEGMENTAL DYSFUNCTION OF LUMBAR REGION: ICD-10-CM

## 2024-04-02 DIAGNOSIS — M99.05 SEGMENTAL DYSFUNCTION OF PELVIC REGION: Primary | ICD-10-CM

## 2024-04-02 DIAGNOSIS — M54.50 CHRONIC BILATERAL LOW BACK PAIN WITHOUT SCIATICA: ICD-10-CM

## 2024-04-02 DIAGNOSIS — M99.04 SEGMENTAL DYSFUNCTION OF SACRAL REGION: ICD-10-CM

## 2024-04-02 DIAGNOSIS — G89.29 CHRONIC BILATERAL LOW BACK PAIN WITHOUT SCIATICA: ICD-10-CM

## 2024-04-02 DIAGNOSIS — M54.2 NECK PAIN: ICD-10-CM

## 2024-04-02 DIAGNOSIS — M99.02 SEGMENTAL DYSFUNCTION OF THORACIC REGION: ICD-10-CM

## 2024-04-02 DIAGNOSIS — M99.01 SEGMENTAL DYSFUNCTION OF CERVICAL REGION: ICD-10-CM

## 2024-04-02 DIAGNOSIS — M79.18 MYOFASCIAL PAIN: ICD-10-CM

## 2024-04-02 PROCEDURE — 98941 CHIROPRACT MANJ 3-4 REGIONS: CPT | Performed by: CHIROPRACTOR

## 2024-04-02 NOTE — PROGRESS NOTES
Date of first visit: 1/30/2024      HPI:  Amie returns for treatment reports ongoing symptoms neck upper back and lower back.    VPAS  7    Exam reveals a pelvic obliquity elevated right versus left innominate leg with any quality biomechanically joint dysfunction present the right sacroiliac joint L5-S1 L4-5 motion units  The following portions of the patient's history were reviewed and updated as appropriate: allergies, current medications, past family history, past medical history, past social history, past surgical history, and problem list.    Review of Systems    Physical Exam:  Parathoracic hypertonicity noted with the T4-T5 C5-C6 C1-C2 segmental dysfunction cervical range of motion remains reduced.    Assessment:   Diagnosis ICD-10-CM Associated Orders   1. Segmental dysfunction of pelvic region  M99.05       2. Chronic bilateral low back pain without sciatica  M54.50     G89.29       3. Segmental dysfunction of sacral region  M99.04       4. Segmental dysfunction of lumbar region  M99.03       5. Segmental dysfunction of cervical region  M99.01       6. Segmental dysfunction of thoracic region  M99.02       7. Neck pain  M54.2       8. Myofascial pain  M79.18                 Treatment: 98520  Manipulation to the right innominate, sacrum, L5 L4 via Rios drop maneuver T4 C5 C1 via seated stairstepping technique well-tolerated.    Discussion:  Continue home stretching see her back for follow-up

## 2024-08-28 ENCOUNTER — EVALUATION (OUTPATIENT)
Dept: PHYSICAL THERAPY | Facility: REHABILITATION | Age: 74
End: 2024-08-28
Payer: MEDICARE

## 2024-08-28 DIAGNOSIS — M16.12 OSTEOARTHRITIS OF LEFT HIP, UNSPECIFIED OSTEOARTHRITIS TYPE: Primary | ICD-10-CM

## 2024-08-28 PROCEDURE — 97110 THERAPEUTIC EXERCISES: CPT | Performed by: PHYSICAL THERAPIST

## 2024-08-28 PROCEDURE — 97161 PT EVAL LOW COMPLEX 20 MIN: CPT | Performed by: PHYSICAL THERAPIST

## 2024-08-28 NOTE — PROGRESS NOTES
PT Evaluation     Today's date: 2024  Patient name: Trinh Christopher  : 1950  MRN: [id number]  Referring provider: Kole Peralta*  Dx:   Encounter Diagnosis     ICD-10-CM    1. Osteoarthritis of left hip, unspecified osteoarthritis type  M16.12           Start Time: 1404  Stop Time: 1445  Total time in clinic (min): 41 minutes    Assessment  Impairments: abnormal gait, abnormal or restricted ROM, activity intolerance, impaired physical strength, lacks appropriate home exercise program and pain with function    Assessment details: Trinh Christopher is a 73 y.o. year old female who presents to  with Osteoarthritis of left hip, unspecified osteoarthritis type  (primary encounter diagnosis). Trinh presents with limitations in left hip range of motion and significant strength deficits in bilateral glutes. She is currently limited with walking, standing and stair climbing which she is required to do at her job as a . Trinh presents with the impairments as listed above and would benefit from Physical Therapy to address these impairments, improved quality of life and to maximize function.       Goals  Short-Term Goals: 2-4 weeks  1. Patient will report no greater than 3/10 pain in the left hip.  2. Patient will demonstrate improvement in BLE hip strength to at least 4/5.    Long-Term Goals: 4-6 weeks  1. Patient will demonstrate near normal gait pattern without limitation from pain.  2. Patient independent with HEP at time of discharge.       Plan  Patient would benefit from: skilled physical therapy  Planned modality interventions: cryotherapy, TENS and thermotherapy: hydrocollator packs    Frequency: 2x week  Duration in weeks: 8  Plan of Care beginning date: 2024  Plan of Care expiration date: 10/23/2024  Treatment plan discussed with: patient        Subjective Evaluation    History of Present Illness  Mechanism of injury: Patient is a 73 y.o. presenting to physical  therapy with complaints of chronic left hip pain over the last few months. Pain began in the left groin and radiating to the outside of the hip. It can also radiate to the lateral thigh. She began to have difficulty with walking and limping. She received a let hip joint cortisone injection on 7/31 with relief for about 2 weeks.    Imaging: x-rays - osteoarthritis    Next f/u with MD October           Patient Goals  Patient goals for therapy: decreased pain, increased motion, increased strength, independence with ADLs/IADLs and return to sport/leisure activities  Patient goal: help relieve the pain, delay potential surgery, work as a  without limitation  Pain  Current pain rating: 10  At best pain rating: 3  At worst pain rating: 10  Location: left groin and lateral hip  Quality: dull ache, sharp, throbbing and radiating (constant ache)  Relieving factors: medications and heat (Tylenol, Advil, Aleve prn)  Aggravating factors: walking, standing and stair climbing    Social Support  Steps to enter house: no  Stairs in house: yes ([facility])   13  Lives in: multiple-level home  Lives with: spouse    Employment status: working (retired- working as a  3-4 days/week)    Diagnostic Tests  X-ray: abnormal  Treatments  Previous treatment: chiropractic  Current treatment: injection treatment, medication and physical therapy        Objective     Active Range of Motion   Left Hip   Flexion: 95 degrees with pain  Abduction: 10 degrees with pain    Passive Range of Motion   Left Hip   Flexion: 95 degrees with pain  External rotation (90/90): WFL  Internal rotation (90/90): WFL    Right Hip   Flexion: WFL  External rotation (90/90): WFL  Internal rotation (90/90): WFL    Strength/Myotome Testing     Left Hip   Planes of Motion   Flexion: 4 (pain)  Extension: 2  Abduction: 3-  External rotation: 4  Internal rotation: 4    Right Hip   Planes of Motion   Flexion: 4  Extension: 2+  Abduction: 3  External  "rotation: 4+  Internal rotation: 4+    Left Knee   Flexion: 5  Extension: 5    Right Knee   Flexion: 5  Extension: 5    Left Ankle/Foot   Dorsiflexion: 5    Right Ankle/Foot   Dorsiflexion: 5    Tests     Lumbar     Left   Negative passive SLR.     Right   Negative passive SLR.     Left Hip   90/90 SLR: Negative.     Right Hip   90/90 SLR: Positive.              Precautions: HTN, bilateral TKAs      Manuals 8/28            L hip PROM                                                    Neuro Re-Ed             Supine iso clamshells GTB 2x10 b/l            Bridging 2x10            Standing hip abd nv            Standing hip ext nv            TB side stepping nv                                      Ther Ex             Bike - strength/ROM nv            Seated HS stretch 3x20\" b/l            LTR 10x5\"                                                   Ther Activity             STS w TB at hips for glute drive **            Step ups             Lateral step ups                                       Modalities                                            "

## 2024-09-03 ENCOUNTER — OFFICE VISIT (OUTPATIENT)
Dept: PHYSICAL THERAPY | Facility: REHABILITATION | Age: 74
End: 2024-09-03
Payer: MEDICARE

## 2024-09-03 DIAGNOSIS — M16.12 OSTEOARTHRITIS OF LEFT HIP, UNSPECIFIED OSTEOARTHRITIS TYPE: Primary | ICD-10-CM

## 2024-09-03 PROCEDURE — 97110 THERAPEUTIC EXERCISES: CPT | Performed by: PHYSICAL THERAPIST

## 2024-09-03 PROCEDURE — 97112 NEUROMUSCULAR REEDUCATION: CPT | Performed by: PHYSICAL THERAPIST

## 2024-09-03 PROCEDURE — 97530 THERAPEUTIC ACTIVITIES: CPT | Performed by: PHYSICAL THERAPIST

## 2024-09-03 NOTE — PROGRESS NOTES
"Daily Note     Today's date: 9/3/2024  Patient name: Trinh Christopher  : 1950  MRN: [id number]  Referring provider: Kole Peralta*  Dx:   Encounter Diagnosis     ICD-10-CM    1. Osteoarthritis of left hip, unspecified osteoarthritis type  M16.12           Start Time: 1631  Stop Time: 1710  Total time in clinic (min): 39 minutes    Subjective: Patient reports some soreness while completing the exercises at home over the weekend.      Objective: See treatment diary below      Assessment: Tolerated treatment well. Patient demonstrated fatigue post treatment, exhibited good technique with therapeutic exercises, and would benefit from continued PT. Initiated plan of care this visit progressing hip strengthening with good tolerance. No complaints of pain throughout and patient reports good status post-treatment. Assess response next visit and update HEP as appropriate.      Plan: Continue per plan of care.      Precautions: HTN, bilateral TKAs      Manuals  9/3           L hip PROM                                                    Neuro Re-Ed             Supine iso clamshells GTB 2x10 b/l GTB 3x10 b/l           Bridging 2x10 2x10           Standing hip abd nv [place] 2x10 b/l           Standing hip ext nv [place] 2x10 b/l           TB side stepping nv Pink 3 laps counter                                     Ther Ex             Bike - strength/ROM nv 5' lv 1           Seated HS stretch 3x20\" b/l 3x20\" b/l           LTR 10x5\" 10x5\" b/l                                                  Ther Activity             STS w TB at hips for glute drive ** nv           Step ups  4\" 2x10 L           Lateral step ups  4\" 2x10 L                                     Modalities                                            "

## 2024-09-05 ENCOUNTER — OFFICE VISIT (OUTPATIENT)
Dept: PHYSICAL THERAPY | Facility: REHABILITATION | Age: 74
End: 2024-09-05
Payer: MEDICARE

## 2024-09-05 DIAGNOSIS — M16.12 OSTEOARTHRITIS OF LEFT HIP, UNSPECIFIED OSTEOARTHRITIS TYPE: Primary | ICD-10-CM

## 2024-09-05 PROCEDURE — 97112 NEUROMUSCULAR REEDUCATION: CPT | Performed by: PHYSICAL THERAPIST

## 2024-09-05 PROCEDURE — 97110 THERAPEUTIC EXERCISES: CPT | Performed by: PHYSICAL THERAPIST

## 2024-09-05 NOTE — PROGRESS NOTES
"Daily Note     Today's date: 2024  Patient name: Trinh Christopher  : 1950  MRN: [id number]  Referring provider: Kole Peralta*  Dx:   Encounter Diagnosis     ICD-10-CM    1. Osteoarthritis of left hip, unspecified osteoarthritis type  M16.12           Start Time: 1605  Stop Time: 1650  Total time in clinic (min): 45 minutes    Subjective: Patient reports the hip felt really good after last visit for 2 days. She had increased pain today. She was sitting on a hard floor today.      Objective: See treatment diary below      Assessment: Tolerated treatment well. Patient demonstrated fatigue post treatment, exhibited good technique with therapeutic exercises, and would benefit from continued PT. Overall good tolerance to TE and initiation of LAD. No complaints of pain throughout session. Provided patient with updated HEP.      Plan: Continue per plan of care.      Precautions: HTN, bilateral TKAs      Manuals 8/28 9/3 9/5          L hip PROM             L LAD   Done                                    Neuro Re-Ed             Supine iso clamshells GTB 2x10 b/l GTB 3x10 b/l BTB 3x10 b/l          Bridging 2x10 2x10 3x10          Standing hip abd nv [place] 2x10 b/l Pink 3x10 b/l          Standing hip ext nv [place] 2x10 b/l Pink 3x10 b/l          TB side stepping nv Pink 3 laps counter Pink 5 laps counter                                    Ther Ex             Bike - strength/ROM nv 5' lv 1 5' lv 1           Seated HS stretch 3x20\" b/l 3x20\" b/l 3x20\" b/l          LTR 10x5\" 10x5\" b/l 10x5\" b/l                                                 Ther Activity             STS w TB at hips for glute drive ** nv nv          Step ups  4\" 2x10 L 4\" 3x10 L          Lateral step ups  4\" 2x10 L nv                                    Modalities                                              "

## 2024-09-09 ENCOUNTER — OFFICE VISIT (OUTPATIENT)
Dept: PHYSICAL THERAPY | Facility: REHABILITATION | Age: 74
End: 2024-09-09
Payer: MEDICARE

## 2024-09-09 DIAGNOSIS — M16.12 OSTEOARTHRITIS OF LEFT HIP, UNSPECIFIED OSTEOARTHRITIS TYPE: Primary | ICD-10-CM

## 2024-09-09 PROCEDURE — 97112 NEUROMUSCULAR REEDUCATION: CPT | Performed by: PHYSICAL THERAPIST

## 2024-09-09 PROCEDURE — 97110 THERAPEUTIC EXERCISES: CPT | Performed by: PHYSICAL THERAPIST

## 2024-09-09 PROCEDURE — 97530 THERAPEUTIC ACTIVITIES: CPT | Performed by: PHYSICAL THERAPIST

## 2024-09-09 NOTE — PROGRESS NOTES
"Daily Note     Today's date: 2024  Patient name: Trinh Christopher  : 1950  MRN: [id number]  Referring provider: Kole Peralta*  Dx:   Encounter Diagnosis     ICD-10-CM    1. Osteoarthritis of left hip, unspecified osteoarthritis type  M16.12           Start Time: 1615  Stop Time: 1700  Total time in clinic (min): 45 minutes    Subjective: Patient reports feeling ok today. She worked today so she is more tired. She feels the hip distraction helped after last visit.      Objective: See treatment diary below      Assessment: Tolerated treatment well. Patient demonstrated fatigue post treatment, exhibited good technique with therapeutic exercises, and would benefit from continued PT. No complaints of pain throughout session. Initiated additional glute strengthening with good tolerance, evident muscle fatigue noted. Patient reports relief with LAD.      Plan: Continue per plan of care.      Precautions: HTN, bilateral TKAs      Manuals 8/28 9/3 9/5 9         L hip PROM             L LAD   Done Done                                    Neuro Re-Ed             Supine iso clamshells GTB 2x10 b/l GTB 3x10 b/l BTB 3x10 b/l BTB 3x12 b/l         Bridging 2x10 2x10 3x10 3x10         Standing hip abd nv [place] 2x10 b/l Pink 3x10 b/l Pink 3x10 b/l         Standing hip ext nv [place] 2x10 b/l Pink 3x10 b/l Pink 3x10 b/l         TB side stepping nv Pink 3 laps counter Pink 5 laps counter Pink 5 laps count ea                                   Ther Ex             Bike - strength/ROM nv 5' lv 1 5' lv 1  5' lv 1          Seated HS stretch 3x20\" b/l 3x20\" b/l 3x20\" b/l 3x20\" b/l         LTR 10x5\" 10x5\" b/l 10x5\" b/l 10x5\" b/l                                                Ther Activity             STS w TB at hips for glute drive ** nv nv BTB 2x10         Step ups  4\" 2x10 L 4\" 3x10 L 4\" 3x10 L         Lateral step ups  4\" 2x10 L nv                                    Modalities                                     "

## 2024-09-12 ENCOUNTER — OFFICE VISIT (OUTPATIENT)
Dept: PHYSICAL THERAPY | Facility: REHABILITATION | Age: 74
End: 2024-09-12
Payer: MEDICARE

## 2024-09-12 DIAGNOSIS — M16.12 OSTEOARTHRITIS OF LEFT HIP, UNSPECIFIED OSTEOARTHRITIS TYPE: Primary | ICD-10-CM

## 2024-09-12 PROCEDURE — 97110 THERAPEUTIC EXERCISES: CPT | Performed by: PHYSICAL THERAPIST

## 2024-09-12 PROCEDURE — 97112 NEUROMUSCULAR REEDUCATION: CPT | Performed by: PHYSICAL THERAPIST

## 2024-09-12 PROCEDURE — 97530 THERAPEUTIC ACTIVITIES: CPT | Performed by: PHYSICAL THERAPIST

## 2024-09-12 NOTE — PROGRESS NOTES
"Daily Note     Today's date: 2024  Patient name: Trinh Christopher  : 1950  MRN: [id number]  Referring provider: Kole Peralta*  Dx:   Encounter Diagnosis     ICD-10-CM    1. Osteoarthritis of left hip, unspecified osteoarthritis type  M16.12           Start Time: 1620  Stop Time: 1705  Total time in clinic (min): 45 minutes    Subjective: Patient reports no problems after last session stating \"I always feel better after leaving here.\"      Objective: See treatment diary below      Assessment: Tolerated treatment well. Patient demonstrated fatigue post treatment, exhibited good technique with therapeutic exercises, and would benefit from continued PT. Progressed hip strengthening this visit with good tolerance.      Plan: Continue per plan of care.  Progress treatment as tolerated.       Precautions: HTN, bilateral TKAs      Manuals 8/28 9/3 9/5 9/9 9/12        L hip PROM             L LAD   Done Done  Done                                   Neuro Re-Ed             Supine iso clamshells GTB 2x10 b/l GTB 3x10 b/l BTB 3x10 b/l BTB 3x12 b/l S/l GTB 3x10 b/l        Bridging 2x10 2x10 3x10 3x10 3x12        Standing hip abd nv [place] 2x10 b/l Pink 3x10 b/l Pink 3x10 b/l Pink 3x10 b/l        Standing hip ext nv [place] 2x10 b/l Pink 3x10 b/l Pink 3x10 b/l Pink 3x10 b/l        TB side stepping nv Pink 3 laps counter Pink 5 laps counter Pink 5 laps count ea Pink 5 laps counter         SLS     nv                     Ther Ex             Bike - strength/ROM nv 5' lv 1 5' lv 1  5' lv 1  5' lv 1        Seated HS stretch 3x20\" b/l 3x20\" b/l 3x20\" b/l 3x20\" b/l 3x20\" b/l        LTR 10x5\" 10x5\" b/l 10x5\" b/l 10x5\" b/l 20x5\"                                               Ther Activity             STS w TB at hips for glute drive ** nv nv BTB 2x10 3x10        Step ups  4\" 2x10 L 4\" 3x10 L 4\" 3x10 L 6\" 3x10 L        Lateral step ups  4\" 2x10 L nv  6\" 3x10 L        Mini squats     nv                     Modalities       "

## 2024-09-16 ENCOUNTER — OFFICE VISIT (OUTPATIENT)
Dept: PHYSICAL THERAPY | Facility: REHABILITATION | Age: 74
End: 2024-09-16
Payer: MEDICARE

## 2024-09-16 DIAGNOSIS — M16.12 OSTEOARTHRITIS OF LEFT HIP, UNSPECIFIED OSTEOARTHRITIS TYPE: Primary | ICD-10-CM

## 2024-09-16 PROCEDURE — 97110 THERAPEUTIC EXERCISES: CPT | Performed by: PHYSICAL THERAPIST

## 2024-09-16 PROCEDURE — 97140 MANUAL THERAPY 1/> REGIONS: CPT | Performed by: PHYSICAL THERAPIST

## 2024-09-16 NOTE — PROGRESS NOTES
Daily Note     Today's date: 2024  Patient name: Trinh Christopher  : 1950  MRN: [id number]  Referring provider: Kole Peralta*  Dx:   Encounter Diagnosis     ICD-10-CM    1. Osteoarthritis of left hip, unspecified osteoarthritis type  M16.12           Start Time: 1740  Stop Time: 1810  Total time in clinic (min): 30 minutes    Subjective: Patient presents to PT with significant increase in left hip pain this visit in the groin and lateral hip. This began 2 days ago and she denies changes to activity over the weekend. No pain after leaving last session. She is scheduled for ortho f/u on .      Objective: See treatment diary below      Assessment: Tolerated treatment well. Patient would benefit from continued PT. Held all strengthening TE this visit secondary to significant increase in left hip pain limiting ambulation and transfers. Patient demonstrates decreased left stance time and right trunk lean with ambulation. Mild tension to glute med/piriformis and significant tenderness noted, some relief with manual therapy and gentle stretching. Discussed pain management modalities such as heat and medication prn (as advised by MD) and trial of tennis ball/massage ball for targeted muscle releases and gentle stretching. Likely aggravated from increase in activity last week as she worked as a  all week and began PT.      Plan: Continue per plan of care.      Precautions: HTN, bilateral TKAs      Manuals 8/28 9/3 9/5 9/9 9/12 9/16       L hip PROM             L LAD   Done Done  Done         L glute/piriformis STM      Done                    Neuro Re-Ed             Supine iso clamshells GTB 2x10 b/l GTB 3x10 b/l BTB 3x10 b/l BTB 3x12 b/l S/l GTB 3x10 b/l        Bridging 2x10 2x10 3x10 3x10 3x12        Standing hip abd nv [place] 2x10 b/l Pink 3x10 b/l Pink 3x10 b/l Pink 3x10 b/l        Standing hip ext nv [place] 2x10 b/l Pink 3x10 b/l Pink 3x10 b/l Pink 3x10 b/l        TB side  "stepping nv [place] 3 laps counter Pink 5 laps counter Pink 5 laps count ea Pink 5 laps counter         SLS     nv                     Ther Ex             Bike - strength/ROM nv 5' lv 1 5' lv 1  5' lv 1  5' lv 1        Seated HS stretch 3x20\" b/l 3x20\" b/l 3x20\" b/l 3x20\" b/l 3x20\" b/l 3x20\" b/l       LTR 10x5\" 10x5\" b/l 10x5\" b/l 10x5\" b/l 20x5\" 20x5\"                                              Ther Activity             STS w TB at hips for glute drive ** nv nv BTB 2x10 3x10        Step ups  4\" 2x10 L 4\" 3x10 L 4\" 3x10 L 6\" 3x10 L        Lateral step ups  4\" 2x10 L nv  6\" 3x10 L        Mini squats     nv        Pt edu      Done        Modalities                                                    "

## 2024-09-19 ENCOUNTER — OFFICE VISIT (OUTPATIENT)
Dept: PHYSICAL THERAPY | Facility: REHABILITATION | Age: 74
End: 2024-09-19
Payer: MEDICARE

## 2024-09-19 DIAGNOSIS — M16.12 OSTEOARTHRITIS OF LEFT HIP, UNSPECIFIED OSTEOARTHRITIS TYPE: Primary | ICD-10-CM

## 2024-09-19 PROCEDURE — 97110 THERAPEUTIC EXERCISES: CPT | Performed by: PHYSICAL THERAPIST

## 2024-09-19 PROCEDURE — 97140 MANUAL THERAPY 1/> REGIONS: CPT | Performed by: PHYSICAL THERAPIST

## 2024-09-19 PROCEDURE — 97112 NEUROMUSCULAR REEDUCATION: CPT | Performed by: PHYSICAL THERAPIST

## 2024-09-19 NOTE — PROGRESS NOTES
"Daily Note     Today's date: 2024  Patient name: Trinh Christopher  : 1950  MRN: [id number]  Referring provider: Kole Peralta*  Dx:   Encounter Diagnosis     ICD-10-CM    1. Osteoarthritis of left hip, unspecified osteoarthritis type  M16.12           Start Time: 1615  Stop Time: 1653  Total time in clinic (min): 38 minutes    Subjective: Patient saw MD yesterday and is scheduled for a left hip replacement in January. She reports pain is a little better than last visit.      Objective: See treatment diary below      Assessment: Tolerated treatment well. Patient demonstrated fatigue post treatment, exhibited good technique with therapeutic exercises, and would benefit from continued PT. Re-initiated plan of care with strengthening exercises regressing resistance and repetitions with good tolerance.       Plan: Continue per plan of care.      Precautions: HTN, bilateral TKAs      Manuals 8/28 9/3 9/5 9/9 9/12 9/16 9/19      L hip PROM             L LAD   Done Done  Done         L glute/piriformis STM      Done Done                   Neuro Re-Ed             Supine iso clamshells GTB 2x10 b/l GTB 3x10 b/l BTB 3x10 b/l BTB 3x12 b/l S/l GTB 3x10 b/l  Supine GTB 3x10 b/l      Bridging 2x10 2x10 3x10 3x10 3x12        Standing hip abd nv Pink 2x10 b/l Pink 3x10 b/l Pink 3x10 b/l Pink 3x10 b/l  2x10 b/l      Standing hip ext nv [place] 2x10 b/l Pink 3x10 b/l Pink 3x10 b/l Pink 3x10 b/l  2x10 b/l      TB side stepping nv Pink 3 laps counter Pink 5 laps counter Pink 5 laps count ea Pink 5 laps counter         SLS     nv                     Ther Ex             Bike - strength/ROM nv 5' lv 1 5' lv 1  5' lv 1  5' lv 1  5' lv 1      Seated HS stretch 3x20\" b/l 3x20\" b/l 3x20\" b/l 3x20\" b/l 3x20\" b/l 3x20\" b/l 3x30\" b/l      LTR 10x5\" 10x5\" b/l 10x5\" b/l 10x5\" b/l 20x5\" 20x5\" 20x5\"                                             Ther Activity             STS w TB at hips for glute drive ** nv nv BTB 2x10 3x10      " "  Step ups  4\" 2x10 L 4\" 3x10 L 4\" 3x10 L 6\" 3x10 L        Lateral step ups  4\" 2x10 L nv  6\" 3x10 L        Mini squats     nv        Pt edu      Done        Modalities                                                      "

## 2024-09-23 ENCOUNTER — OFFICE VISIT (OUTPATIENT)
Dept: PHYSICAL THERAPY | Facility: REHABILITATION | Age: 74
End: 2024-09-23
Payer: MEDICARE

## 2024-09-23 DIAGNOSIS — M16.12 OSTEOARTHRITIS OF LEFT HIP, UNSPECIFIED OSTEOARTHRITIS TYPE: Primary | ICD-10-CM

## 2024-09-23 PROCEDURE — 97110 THERAPEUTIC EXERCISES: CPT

## 2024-09-23 PROCEDURE — 97140 MANUAL THERAPY 1/> REGIONS: CPT

## 2024-09-26 ENCOUNTER — OFFICE VISIT (OUTPATIENT)
Dept: PHYSICAL THERAPY | Facility: REHABILITATION | Age: 74
End: 2024-09-26
Payer: MEDICARE

## 2024-09-26 DIAGNOSIS — M16.12 OSTEOARTHRITIS OF LEFT HIP, UNSPECIFIED OSTEOARTHRITIS TYPE: Primary | ICD-10-CM

## 2024-09-26 PROCEDURE — 97110 THERAPEUTIC EXERCISES: CPT

## 2024-09-26 PROCEDURE — 97112 NEUROMUSCULAR REEDUCATION: CPT

## 2024-09-26 PROCEDURE — 97140 MANUAL THERAPY 1/> REGIONS: CPT

## 2024-09-26 NOTE — PROGRESS NOTES
"Daily Note     Today's date: 2024  Patient name: Trinh Christopher  : 1950  MRN: [id number]  Referring provider: Kole Peralta*  Dx:   Encounter Diagnosis     ICD-10-CM    1. Osteoarthritis of left hip, unspecified osteoarthritis type  M16.12           Start Time: 1615  Stop Time: 1710  Total time in clinic (min): 55 minutes    Subjective: Patient reports hip as \"okay\" at start of session stating \"it might be a little better.\" She reports relief after previous session.       Objective: See treatment diary below      Assessment: Tolerated treatment well. Patient demonstrated fatigue post treatment, exhibited good technique with therapeutic exercises, and would benefit from continued PT Trialed shallow bridges today and increased reps with certain TE with good tolerance noted by patient, no pain noted, only reports of muscle fatigue. Patient reports relief with manuals. Will attempt to re-introduce [place] TE next session as able.       Plan: Continue per plan of care.  Progress treatment as tolerated.       Precautions: HTN, bilateral TKAs      Manuals 8/28 9/3 9/5 9/9 9/12 9/16 9/19 9/23 9/26    L hip PROM             L LAD   Done Done  Done         L glute/piriformis STM      Done Done Done Done                  Neuro Re-Ed             Supine iso clamshells GTB 2x10 b/l GTB 3x10 b/l BTB 3x10 b/l BTB 3x12 b/l S/l GTB 3x10 b/l  Supine GTB 3x10 b/l Supine GTB 3x12 b/l Supine 3x12 b/l    Bridging 2x10 2x10 3x10 3x10 3x12    2x10 shallow    Standing hip abd nv Pink 2x10 b/l Pink 3x10 b/l Pink 3x10 b/l Pink 3x10 b/l  2x10 b/l 2x10 b/l 3x10 b/l    Standing hip ext nv [place] 2x10 b/l Pink 3x10 b/l Pink 3x10 b/l Pink 3x10 b/l  2x10 b/l 2x10 b/l 3x10 b/l    TB side stepping nv Pink 3 laps counter Pink 5 laps counter Pink 5 laps count ea Pink 5 laps counter         SLS     nv                     Ther Ex             Bike - strength/ROM nv 5' lv 1 5' lv 1  5' lv 1  5' lv 1  5' lv 1 5' lvl 1 5 lvl 1    Seated " "HS stretch 3x20\" b/l 3x20\" b/l 3x20\" b/l 3x20\" b/l 3x20\" b/l 3x20\" b/l 3x30\" b/l 3x30'' b/l 3x30'' b/l    LTR 10x5\" 10x5\" b/l 10x5\" b/l 10x5\" b/l 20x5\" 20x5\" 20x5\" 20x5'' 20x5''                                           Ther Activity             STS w TB at hips for glute drive ** nv nv BTB 2x10 3x10        Step ups  4\" 2x10 L 4\" 3x10 L 4\" 3x10 L 6\" 3x10 L    nv    Lateral step ups  4\" 2x10 L nv  6\" 3x10 L        Mini squats     nv        Pt edu      Done        Modalities             MHP        Done at end of session Done at end of session                                    "

## 2024-09-30 ENCOUNTER — OFFICE VISIT (OUTPATIENT)
Dept: PHYSICAL THERAPY | Facility: REHABILITATION | Age: 74
End: 2024-09-30
Payer: MEDICARE

## 2024-09-30 DIAGNOSIS — M16.12 OSTEOARTHRITIS OF LEFT HIP, UNSPECIFIED OSTEOARTHRITIS TYPE: Primary | ICD-10-CM

## 2024-09-30 PROCEDURE — 97110 THERAPEUTIC EXERCISES: CPT | Performed by: PHYSICAL THERAPIST

## 2024-09-30 PROCEDURE — 97112 NEUROMUSCULAR REEDUCATION: CPT | Performed by: PHYSICAL THERAPIST

## 2024-09-30 PROCEDURE — 97140 MANUAL THERAPY 1/> REGIONS: CPT | Performed by: PHYSICAL THERAPIST

## 2024-09-30 NOTE — PROGRESS NOTES
"Daily Note     Today's date: 2024  Patient name: Trinh Christopher  : 1950  MRN: [id number]  Referring provider: Kole Peralta*  Dx:   Encounter Diagnosis     ICD-10-CM    1. Osteoarthritis of left hip, unspecified osteoarthritis type  M16.12           Start Time: 1617  Stop Time: 1710  Total time in clinic (min): 53 minutes    Subjective: Patient reports the hip felt really good after last session and over the weekend. She was even out in the rain over the weekend and did not have to take pain medication.      Objective: See treatment diary below      Assessment: Tolerated treatment well. Patient demonstrated fatigue post treatment, exhibited good technique with therapeutic exercises, and would benefit from continued PT. Kept all TE at same level secondary to relief after last session. No complaints of pain throughout.      Plan: Continue per plan of care.      Precautions: HTN, bilateral TKAs      Manuals 8/28 9/3 9/5 9/9 9/12 9/16 9/19 9/23 9/26 9/30   L hip PROM             L LAD   Done Done  Done         L glute/piriformis STM      Done Done Done Done  Done                Neuro Re-Ed             Supine iso clamshells GTB 2x10 b/l GTB 3x10 b/l BTB 3x10 b/l BTB 3x12 b/l S/l GTB 3x10 b/l  Supine GTB 3x10 b/l Supine GTB 3x12 b/l Supine 3x12 b/l Supine GTB 3x10 b/l   Bridging 2x10 2x10 3x10 3x10 3x12    2x10 shallow 2x10 shallow   Standing hip abd nv Pink 2x10 b/l Pink 3x10 b/l Pink 3x10 b/l Pink 3x10 b/l  2x10 b/l 2x10 b/l 3x10 b/l 3x10 b/l   Standing hip ext nv [place] 2x10 b/l Pink 3x10 b/l Pink 3x10 b/l Pink 3x10 b/l  2x10 b/l 2x10 b/l 3x10 b/l 3x10 b/l    TB side stepping nv Pink 3 laps counter Pink 5 laps counter Pink 5 laps count ea Pink 5 laps counter         SLS     nv                     Ther Ex             Bike - strength/ROM nv 5' lv 1 5' lv 1  5' lv 1  5' lv 1  5' lv 1 5' lvl 1 5 lvl 1 5' lv 1   Seated HS stretch 3x20\" b/l 3x20\" b/l 3x20\" b/l 3x20\" b/l 3x20\" b/l 3x20\" b/l 3x30\" b/l " "3x30'' b/l 3x30'' b/l 3x30\" b/l   LTR 10x5\" 10x5\" b/l 10x5\" b/l 10x5\" b/l 20x5\" 20x5\" 20x5\" 20x5'' 20x5'' 20x5\"                                          Ther Activity             STS w TB at hips for glute drive ** nv nv BTB 2x10 3x10        Step ups  4\" 2x10 L 4\" 3x10 L 4\" 3x10 L 6\" 3x10 L    nv    Lateral step ups  4\" 2x10 L nv  6\" 3x10 L        Mini squats     nv        Pt edu      Done        Modalities             MHP        Done at end of session Done at end of session 10' sidelying                                      "

## 2024-10-03 ENCOUNTER — OFFICE VISIT (OUTPATIENT)
Dept: PHYSICAL THERAPY | Facility: REHABILITATION | Age: 74
End: 2024-10-03
Payer: MEDICARE

## 2024-10-03 DIAGNOSIS — M16.12 OSTEOARTHRITIS OF LEFT HIP, UNSPECIFIED OSTEOARTHRITIS TYPE: Primary | ICD-10-CM

## 2024-10-03 PROCEDURE — 97140 MANUAL THERAPY 1/> REGIONS: CPT

## 2024-10-03 PROCEDURE — 97110 THERAPEUTIC EXERCISES: CPT

## 2024-10-03 PROCEDURE — 97112 NEUROMUSCULAR REEDUCATION: CPT

## 2024-10-03 NOTE — PROGRESS NOTES
"Daily Note     Today's date: 10/3/2024  Patient name: Trinh Christopher  : 1950  MRN: [id number]  Referring provider: Kole Peralta*  Dx:   Encounter Diagnosis     ICD-10-CM    1. Osteoarthritis of left hip, unspecified osteoarthritis type  M16.12           Start Time: 1619  Stop Time: 1710  Total time in clinic (min): 51 minutes    Subjective: Patient reports her hip has been feeling better as of late, reporting relief with PT and HEP.       Objective: See treatment diary below      Assessment: Tolerated treatment well. Patient demonstrated fatigue post treatment, exhibited good technique with therapeutic exercises, and would benefit from continued PT Did not add additional TE today secondary to patient feeling well since previous session.       Plan: Continue per plan of care.  Progress treatment as tolerated.       Precautions: HTN, bilateral TKAs      Manuals 10/3  9/9 9/12 9/16 9/19 9/23 9/26 9/30   L hip PROM            L LAD   Done  Done         L glute/piriformis STM Done     Done Done Done Done  Done               Neuro Re-Ed            Supine iso clamshells Supine GTB 3x10 b/l  BTB 3x12 b/l S/l GTB 3x10 b/l  Supine GTB 3x10 b/l Supine GTB 3x12 b/l Supine 3x12 b/l Supine GTB 3x10 b/l   Bridging 2x10 shallow  3x10 3x12    2x10 shallow 2x10 shallow   Standing hip abd 3x12 b/l  Pink 3x10 b/l Pink 3x10 b/l  2x10 b/l 2x10 b/l 3x10 b/l 3x10 b/l   Standing hip ext 3x12 b/l  Pink 3x10 b/l Pink 3x10 b/l  2x10 b/l 2x10 b/l 3x10 b/l 3x10 b/l    TB side stepping   Pink 5 laps count ea Pink 5 laps counter         SLS    nv                    Ther Ex            Bike - strength/ROM 5' lvl 1  5' lv 1  5' lv 1  5' lv 1 5' lvl 1 5 lvl 1 5' lv 1   Seated HS stretch 3x30'' b/l  3x20\" b/l 3x20\" b/l 3x20\" b/l 3x30\" b/l 3x30'' b/l 3x30'' b/l 3x30\" b/l   LTR 20x5''  10x5\" b/l 20x5\" 20x5\" 20x5\" 20x5'' 20x5'' 20x5\"                                       Ther Activity            STS w TB at hips for glute drive   " "BTB 2x10 3x10        Step ups   4\" 3x10 L 6\" 3x10 L    nv    Lateral step ups    6\" 3x10 L        Mini squats    nv        Pt edu     Done        Modalities            MHP 10' sidelying      Done at end of session Done at end of session 10' sidelying                                       "

## 2024-10-07 ENCOUNTER — APPOINTMENT (OUTPATIENT)
Dept: PHYSICAL THERAPY | Facility: REHABILITATION | Age: 74
End: 2024-10-07
Payer: MEDICARE

## 2024-10-10 ENCOUNTER — OFFICE VISIT (OUTPATIENT)
Dept: PHYSICAL THERAPY | Facility: REHABILITATION | Age: 74
End: 2024-10-10
Payer: MEDICARE

## 2024-10-10 DIAGNOSIS — M16.12 OSTEOARTHRITIS OF LEFT HIP, UNSPECIFIED OSTEOARTHRITIS TYPE: Primary | ICD-10-CM

## 2024-10-10 PROCEDURE — 97110 THERAPEUTIC EXERCISES: CPT | Performed by: PHYSICAL THERAPIST

## 2024-10-10 PROCEDURE — 97112 NEUROMUSCULAR REEDUCATION: CPT | Performed by: PHYSICAL THERAPIST

## 2024-10-10 PROCEDURE — 97140 MANUAL THERAPY 1/> REGIONS: CPT | Performed by: PHYSICAL THERAPIST

## 2024-10-10 NOTE — PROGRESS NOTES
"Daily Note     Today's date: 10/10/2024  Patient name: Trinh Christopher  : 1950  MRN: [id number]  Referring provider: Kole Peralta*  Dx:   Encounter Diagnosis     ICD-10-CM    1. Osteoarthritis of left hip, unspecified osteoarthritis type  M16.12           Start Time: 1630  Stop Time: 1721  Total time in clinic (min): 51 minutes    Subjective: Patient was at a football game over the weekend and was able to tolerate the activity well. She reports the hip is feeling pretty good today.        Objective: See treatment diary below      Assessment: Tolerated treatment well. Patient demonstrated fatigue post treatment, exhibited good technique with therapeutic exercises, and would benefit from continued PT.      Plan: Continue per plan of care.      Precautions: HTN, bilateral TKAs      Manuals 10/3 10/10   9/16 9/19 9/23 9/26 9/30   L hip PROM            L LAD            L glute/piriformis STM Done  Done   Done Done Done Done  Done               Neuro Re-Ed            Supine iso clamshells Supine GTB 3x10 b/l Supine GTB 3x10 b/l    Supine GTB 3x10 b/l Supine GTB 3x12 b/l Supine 3x12 b/l Supine GTB 3x10 b/l   Bridging 2x10 shallow 3x10 shallow      2x10 shallow 2x10 shallow   Standing hip abd 3x12 b/l 3x12 b/l    2x10 b/l 2x10 b/l 3x10 b/l 3x10 b/l   Standing hip ext 3x12 b/l 3x12 b/l    2x10 b/l 2x10 b/l 3x10 b/l 3x10 b/l    TB side stepping            SLS                        Ther Ex            Bike - strength/ROM 5' lvl 1 5' lv 1    5' lv 1 5' lvl 1 5 lvl 1 5' lv 1   Seated HS stretch 3x30'' b/l 3x30\" b/l   3x20\" b/l 3x30\" b/l 3x30'' b/l 3x30'' b/l 3x30\" b/l   LTR 20x5'' 20x5\"   20x5\" 20x5\" 20x5'' 20x5'' 20x5\"                                       Ther Activity            STS w TB at hips for glute drive            Step ups        nv    Lateral step ups            Mini squats            Pt edu     Done        Modalities            MHP 10' sidelying 10 sidelying     Done at end of session Done at " end of session 10' sidelying

## 2024-10-21 ENCOUNTER — OFFICE VISIT (OUTPATIENT)
Dept: PHYSICAL THERAPY | Facility: REHABILITATION | Age: 74
End: 2024-10-21
Payer: MEDICARE

## 2024-10-21 DIAGNOSIS — M16.12 OSTEOARTHRITIS OF LEFT HIP, UNSPECIFIED OSTEOARTHRITIS TYPE: Primary | ICD-10-CM

## 2024-10-21 PROCEDURE — 97112 NEUROMUSCULAR REEDUCATION: CPT

## 2024-10-21 PROCEDURE — 97110 THERAPEUTIC EXERCISES: CPT

## 2024-10-21 PROCEDURE — 97140 MANUAL THERAPY 1/> REGIONS: CPT

## 2024-10-21 NOTE — PROGRESS NOTES
"Daily Note     Today's date: 10/21/2024  Patient name: Trinh Christopher  : 1950  MRN: [id number]  Referring provider: Kole Peralta*  Dx:   Encounter Diagnosis     ICD-10-CM    1. Osteoarthritis of left hip, unspecified osteoarthritis type  M16.12                        Subjective: Patient states that hip is mildly sore prior to start of session. States that she is nervous about soreness with re-initiating PT.        Objective: See treatment diary below      Assessment: Scaled session this session to reduce reps to prevent mod soreness post visit. Ended session with STM and heat for pain modulation. Patient would benefit from continued PT to improve function.    Plan: Continue per plan of care.      Precautions: HTN, bilateral TKAs      Manuals 10/3 10/10 10/21   9/19 9/23 9/26 9/30   L hip PROM            L LAD            L glute/piriformis STM Done  Done Done   Done Done Done  Done               Neuro Re-Ed            Supine iso clamshells Supine GTB 3x10 b/l Supine GTB 3x10 b/l Supine GTB 2x10 b/l   Supine GTB 3x10 b/l Supine GTB 3x12 b/l Supine 3x12 b/l Supine GTB 3x10 b/l   Bridging 2x10 shallow 3x10 shallow 2x10 shallow     2x10 shallow 2x10 shallow   Standing hip abd 3x12 b/l 3x12 b/l 2x10 b/l   2x10 b/l 2x10 b/l 3x10 b/l 3x10 b/l   Standing hip ext 3x12 b/l 3x12 b/l 2x19 b/l   2x10 b/l 2x10 b/l 3x10 b/l 3x10 b/l    TB side stepping            SLS                        Ther Ex            Bike - strength/ROM 5' lvl 1 5' lv 1 5' lvl 0   5' lv 1 5' lvl 1 5 lvl 1 5' lv 1   Seated HS stretch 3x30'' b/l 3x30\" b/l 3x30\" b/l   3x30\" b/l 3x30'' b/l 3x30'' b/l 3x30\" b/l   LTR 20x5'' 20x5\" 20x5\"   20x5\" 20x5'' 20x5'' 20x5\"                                       Ther Activity            STS w TB at hips for glute drive            Step ups        nv    Lateral step ups            Mini squats            Pt edu            Modalities            MHP 10' sidelying 10 sidelying     Done at end of session " Done at end of session 10' sidelying

## 2024-10-24 ENCOUNTER — OFFICE VISIT (OUTPATIENT)
Dept: PHYSICAL THERAPY | Facility: REHABILITATION | Age: 74
End: 2024-10-24
Payer: MEDICARE

## 2024-10-24 DIAGNOSIS — M16.12 OSTEOARTHRITIS OF LEFT HIP, UNSPECIFIED OSTEOARTHRITIS TYPE: Primary | ICD-10-CM

## 2024-10-24 PROCEDURE — 97112 NEUROMUSCULAR REEDUCATION: CPT

## 2024-10-24 PROCEDURE — 97140 MANUAL THERAPY 1/> REGIONS: CPT

## 2024-10-24 NOTE — PROGRESS NOTES
"Daily Note     Today's date: 10/24/2024  Patient name: Trinh Christopher  : 1950  MRN: [id number]  Referring provider: Kole Peralta*  Dx:   Encounter Diagnosis     ICD-10-CM    1. Osteoarthritis of left hip, unspecified osteoarthritis type  M16.12           Start Time: 1625  Stop Time: 1720  Total time in clinic (min): 55 minutes    Subjective: Patient states her hip has been good as of late, reporting relief with PT and HEP.       Objective: See treatment diary below      Assessment: Tolerated treatment well. Patient demonstrated fatigue post treatment, exhibited good technique with therapeutic exercises, and would benefit from continued PT      Plan: Continue per plan of care.  Progress treatment as tolerated.       Precautions: HTN, bilateral TKAs      Manuals 10/3 10/10 10/21 10/24  9/19 9/23 9/26 9/30   L hip PROM            L LAD            L glute/piriformis STM Done  Done Done Done   Done Done Done  Done               Neuro Re-Ed            Supine iso clamshells Supine GTB 3x10 b/l Supine GTB 3x10 b/l Supine GTB 2x10 b/l Supine GTB 3x10 b/l  Supine GTB 3x10 b/l Supine GTB 3x12 b/l Supine 3x12 b/l Supine GTB 3x10 b/l   Bridging 2x10 shallow 3x10 shallow 2x10 shallow 2x10 shallow    2x10 shallow 2x10 shallow   Standing hip abd 3x12 b/l 3x12 b/l 2x10 b/l 3x10 b/l  2x10 b/l 2x10 b/l 3x10 b/l 3x10 b/l   Standing hip ext 3x12 b/l 3x12 b/l 2x19 b/l 3x10 b/l  2x10 b/l 2x10 b/l 3x10 b/l 3x10 b/l    TB side stepping            SLS                        Ther Ex            Bike - strength/ROM 5' lvl 1 5' lv 1 5' lvl 0 5' lvl 1  5' lv 1 5' lvl 1 5 lvl 1 5' lv 1   Seated HS stretch 3x30'' b/l 3x30\" b/l 3x30\" b/l 3x30'' b/l  3x30\" b/l 3x30'' b/l 3x30'' b/l 3x30\" b/l   LTR 20x5'' 20x5\" 20x5\" 20x5''  20x5\" 20x5'' 20x5'' 20x5\"                                       Ther Activity            STS w TB at hips for glute drive            Step ups        nv    Lateral step ups            Mini squats          "   Pt edu            Modalities            MHP 10' sidelying 10 sidelying  10' sidelying   Done at end of session Done at end of session 10' sidelying

## 2024-10-30 ENCOUNTER — OFFICE VISIT (OUTPATIENT)
Dept: PHYSICAL THERAPY | Facility: REHABILITATION | Age: 74
End: 2024-10-30
Payer: MEDICARE

## 2024-10-30 DIAGNOSIS — M16.12 OSTEOARTHRITIS OF LEFT HIP, UNSPECIFIED OSTEOARTHRITIS TYPE: Primary | ICD-10-CM

## 2024-10-30 PROCEDURE — 97112 NEUROMUSCULAR REEDUCATION: CPT | Performed by: PHYSICAL THERAPIST

## 2024-10-30 PROCEDURE — 97140 MANUAL THERAPY 1/> REGIONS: CPT | Performed by: PHYSICAL THERAPIST

## 2024-10-30 PROCEDURE — 97110 THERAPEUTIC EXERCISES: CPT | Performed by: PHYSICAL THERAPIST

## 2024-10-30 NOTE — PROGRESS NOTES
"Daily Note     Today's date: 10/30/2024  Patient name: Trinh Christopher  : 1950  MRN: [id number]  Referring provider: Kole Peralta*  Dx:   Encounter Diagnosis     ICD-10-CM    1. Osteoarthritis of left hip, unspecified osteoarthritis type  M16.12           Start Time: 1615  Stop Time: 1714  Total time in clinic (min): 59 minutes    Subjective: Patient reports a knot in the posterior hip last night that radiated down the leg and into the groin a little.      Objective: See treatment diary below      Assessment: Tolerated treatment well. Patient demonstrated fatigue post treatment, exhibited good technique with therapeutic exercises, and would benefit from continued PT.      Plan: Continue per plan of care.      Precautions: HTN, bilateral TKAs      Manuals 10/3 10/10 10/21 10/24 10/30    9/30   L hip PROM            L LAD            L glute/piriformis STM Done  Done Done Done  Done     Done               Neuro Re-Ed            Supine iso clamshells Supine GTB 3x10 b/l Supine GTB 3x10 b/l Supine GTB 2x10 b/l Supine GTB 3x10 b/l Supine GTB 3x10 b/l    Supine GTB 3x10 b/l   Bridging 2x10 shallow 3x10 shallow 2x10 shallow 2x10 shallow 3x10 shallow    2x10 shallow   Standing hip abd 3x12 b/l 3x12 b/l 2x10 b/l 3x10 b/l 3x10 b/l    3x10 b/l   Standing hip ext 3x12 b/l 3x12 b/l 2x19 b/l 3x10 b/l 310 b/l    3x10 b/l    TB side stepping            SLS                        Ther Ex            Bike - strength/ROM 5' lvl 1 5' lv 1 5' lvl 0 5' lvl 1 5' lv 1    5' lv 1   Seated HS stretch 3x30'' b/l 3x30\" b/l 3x30\" b/l 3x30'' b/l 3x30\" b/l     3x30\" b/l   LTR 20x5'' 20x5\" 20x5\" 20x5'' 20x5\"    20x5\"                                       Ther Activity            STS w TB at hips for glute drive            Step ups            Lateral step ups            Mini squats            Pt edu            Modalities            MHP 10' sidelying 10 sidelying  10' sidelying 10' sidelying     10' sidelying              "

## 2024-11-04 ENCOUNTER — APPOINTMENT (OUTPATIENT)
Dept: PHYSICAL THERAPY | Facility: REHABILITATION | Age: 74
End: 2024-11-04
Payer: MEDICARE

## 2024-11-05 ENCOUNTER — OFFICE VISIT (OUTPATIENT)
Dept: PHYSICAL THERAPY | Facility: REHABILITATION | Age: 74
End: 2024-11-05
Payer: MEDICARE

## 2024-11-05 DIAGNOSIS — M16.12 OSTEOARTHRITIS OF LEFT HIP, UNSPECIFIED OSTEOARTHRITIS TYPE: Primary | ICD-10-CM

## 2024-11-05 PROCEDURE — 97112 NEUROMUSCULAR REEDUCATION: CPT | Performed by: PHYSICAL THERAPIST

## 2024-11-05 PROCEDURE — 97140 MANUAL THERAPY 1/> REGIONS: CPT | Performed by: PHYSICAL THERAPIST

## 2024-11-05 PROCEDURE — 97110 THERAPEUTIC EXERCISES: CPT | Performed by: PHYSICAL THERAPIST

## 2024-11-05 NOTE — PROGRESS NOTES
"Daily Note     Today's date: 2024  Patient name: Trinh Christopher  : 1950  MRN: [id number]  Referring provider: Kole Peralta*  Dx:   Encounter Diagnosis     ICD-10-CM    1. Osteoarthritis of left hip, unspecified osteoarthritis type  M16.12           Start Time: 1451  Stop Time: 1544  Total time in clinic (min): 53 minutes    Subjective: Trinh reports her hip has been improving.       Objective: See treatment diary below      Assessment: Tolerated treatment well. Patient demonstrated appropriate level of challenge and muscular fatigue throughout session and noted good status at end of visit. Patient demonstrated fatigue post treatment, exhibited good technique with therapeutic exercises, and would benefit from continued PT.      Plan: Continue per plan of care.  Progress treatment as tolerated.       Precautions: HTN, bilateral TKAs      Manuals 10/3 10/10 10/21 10/24 10/30 11/5   9/30   L hip PROM            L LAD            L glute/piriformis STM Done  Done Done Done  Done  Done    Done               Neuro Re-Ed            Supine iso clamshells Supine GTB 3x10 b/l Supine GTB 3x10 b/l Supine GTB 2x10 b/l Supine GTB 3x10 b/l Supine GTB 3x10 b/l Supine GTB 3x10 b/l   Supine GTB 3x10 b/l   Bridging 2x10 shallow 3x10 shallow 2x10 shallow 2x10 shallow 3x10 shallow 3x10 shallow   2x10 shallow   Standing hip abd 3x12 b/l 3x12 b/l 2x10 b/l 3x10 b/l 3x10 b/l 3x10 b/l   3x10 b/l   Standing hip ext 3x12 b/l 3x12 b/l 2x19 b/l 3x10 b/l 310 b/l 3x10 b/l   3x10 b/l    TB side stepping            SLS                        Ther Ex            Bike - strength/ROM 5' lvl 1 5' lv 1 5' lvl 0 5' lvl 1 5' lv 1 5' lvl 1   5' lv 1   Seated HS stretch 3x30'' b/l 3x30\" b/l 3x30\" b/l 3x30'' b/l 3x30\" b/l  3x30\" b/l   3x30\" b/l   LTR 20x5'' 20x5\" 20x5\" 20x5'' 20x5\" 20x5\"   20x5\"                                       Ther Activity            STS w TB at hips for glute drive            Step ups            Lateral step " ups            Mini squats            Pt edu            Modalities            MHP 10' sidelying 10 sidelying  10' sidelying 10' sidelying  10' sidelying   10' sidelying

## 2024-11-11 ENCOUNTER — OFFICE VISIT (OUTPATIENT)
Dept: PHYSICAL THERAPY | Facility: REHABILITATION | Age: 74
End: 2024-11-11
Payer: MEDICARE

## 2024-11-11 DIAGNOSIS — M16.12 OSTEOARTHRITIS OF LEFT HIP, UNSPECIFIED OSTEOARTHRITIS TYPE: Primary | ICD-10-CM

## 2024-11-11 PROCEDURE — 97112 NEUROMUSCULAR REEDUCATION: CPT | Performed by: PHYSICAL THERAPIST

## 2024-11-11 PROCEDURE — 97140 MANUAL THERAPY 1/> REGIONS: CPT | Performed by: PHYSICAL THERAPIST

## 2024-11-11 PROCEDURE — 97110 THERAPEUTIC EXERCISES: CPT | Performed by: PHYSICAL THERAPIST

## 2024-11-11 NOTE — PROGRESS NOTES
"Daily Note     Today's date: 2024  Patient name: Trinh Christopher  : 1950  MRN: [id number]  Referring provider: Kole Peralta*  Dx:   Encounter Diagnosis     ICD-10-CM    1. Osteoarthritis of left hip, unspecified osteoarthritis type  M16.12           Start Time: 1520  Stop Time: 1612  Total time in clinic (min): 52 minutes    Subjective: Patient reports the hip feels about the same but not worse.       Objective: See treatment diary below      Assessment: Tolerated treatment well. Good tolerance to all TE this visit with no complaints of hip pain. Patient demonstrated fatigue post treatment, exhibited good technique with therapeutic exercises, and would benefit from continued PT.       Plan: Continue per plan of care.      Precautions: HTN, bilateral TKAs      Manuals 10/3 10/10 10/21 10/24 10/30 11/5 11/11  9/30   L hip PROM            L LAD            L glute/piriformis STM Done  Done Done Done  Done  Done  Done  Done               Neuro Re-Ed            Supine iso clamshells Supine GTB 3x10 b/l Supine GTB 3x10 b/l Supine GTB 2x10 b/l Supine GTB 3x10 b/l Supine GTB 3x10 b/l Supine GTB 3x10 b/l Supine GTB 3x12 b/l  Supine GTB 3x10 b/l   Bridging 2x10 shallow 3x10 shallow 2x10 shallow 2x10 shallow 3x10 shallow 3x10 shallow 3x10 shallow   2x10 shallow   Standing hip abd 3x12 b/l 3x12 b/l 2x10 b/l 3x10 b/l 3x10 b/l 3x10 b/l 3x10 b/l  3x10 b/l   Standing hip ext 3x12 b/l 3x12 b/l 2x19 b/l 3x10 b/l 310 b/l 3x10 b/l 3x10 b/l  3x10 b/l    TB side stepping            SLS                        Ther Ex            Bike - strength/ROM 5' lvl 1 5' lv 1 5' lvl 0 5' lvl 1 5' lv 1 5' lvl 1 5' lv 1  5' lv 1   Seated HS stretch 3x30'' b/l 3x30\" b/l 3x30\" b/l 3x30'' b/l 3x30\" b/l  3x30\" b/l 3x30\" b/l  3x30\" b/l   LTR 20x5'' 20x5\" 20x5\" 20x5'' 20x5\" 20x5\" 20x5\"  20x5\"                                       Ther Activity            STS w TB at hips for glute drive            Step ups            Lateral step " ups            Mini squats            Pt edu            Modalities            MHP 10' sidelying 10 sidelying  10' sidelying 10' sidelying  10' sidelying 10' sidelying  10' sidelying

## 2024-11-21 ENCOUNTER — OFFICE VISIT (OUTPATIENT)
Dept: PHYSICAL THERAPY | Facility: REHABILITATION | Age: 74
End: 2024-11-21
Payer: MEDICARE

## 2024-11-21 DIAGNOSIS — M16.12 OSTEOARTHRITIS OF LEFT HIP, UNSPECIFIED OSTEOARTHRITIS TYPE: Primary | ICD-10-CM

## 2024-11-21 PROCEDURE — 97110 THERAPEUTIC EXERCISES: CPT | Performed by: PHYSICAL THERAPIST

## 2024-11-21 PROCEDURE — 97140 MANUAL THERAPY 1/> REGIONS: CPT | Performed by: PHYSICAL THERAPIST

## 2024-11-21 PROCEDURE — 97112 NEUROMUSCULAR REEDUCATION: CPT | Performed by: PHYSICAL THERAPIST

## 2024-11-21 NOTE — PROGRESS NOTES
"Daily Note     Today's date: 2024  Patient name: Trinh Christopher  : 1950  MRN: [id number]  Referring provider: Kole Peralta*  Dx:   Encounter Diagnosis     ICD-10-CM    1. Osteoarthritis of left hip, unspecified osteoarthritis type  M16.12           Start Time: 1615  Stop Time: 1715  Total time in clinic (min): 60 minutes    Subjective: Patient reports starting to feel some discomfort in the left groin on and off again.       Objective: See treatment diary below      Assessment: Tolerated treatment well. Patient demonstrated fatigue post treatment, exhibited good technique with therapeutic exercises, and would benefit from continued PT.       Plan: Continue per plan of care.      Precautions: HTN, bilateral TKAs      Manuals 10/3 10/10 10/21 10/24 10/30 11/5 11/11 11/21    L hip PROM            L LAD            L glute/piriformis STM Done  Done Done Done  Done  Done  Done Done                 Neuro Re-Ed            Supine iso clamshells Supine GTB 3x10 b/l Supine GTB 3x10 b/l Supine GTB 2x10 b/l Supine GTB 3x10 b/l Supine GTB 3x10 b/l Supine GTB 3x10 b/l Supine GTB 3x12 b/l Supine GTB 3x12 b/l    Bridging 2x10 shallow 3x10 shallow 2x10 shallow 2x10 shallow 3x10 shallow 3x10 shallow 3x10 shallow  3x10 shallow    Standing hip abd 3x12 b/l 3x12 b/l 2x10 b/l 3x10 b/l 3x10 b/l 3x10 b/l 3x10 b/l 3x10 b/l    Standing hip ext 3x12 b/l 3x12 b/l 2x19 b/l 3x10 b/l 310 b/l 3x10 b/l 3x10 b/l 3x10 b/l    TB side stepping            SLS                        Ther Ex            Bike - strength/ROM 5' lvl 1 5' lv 1 5' lvl 0 5' lvl 1 5' lv 1 5' lvl 1 5' lv 1 5' lv 1     Seated HS stretch 3x30'' b/l 3x30\" b/l 3x30\" b/l 3x30'' b/l 3x30\" b/l  3x30\" b/l 3x30\" b/l 3x30\" b/l     LTR 20x5'' 20x5\" 20x5\" 20x5'' 20x5\" 20x5\" 20x5\" 20x5\"    Heel raises        3x10    Gastroc st 1/2 foam        3x30\" b/l                Ther Activity            STS w TB at hips for glute drive            Step ups            Lateral " step ups            Mini squats            Pt edu            Modalities            MHP 10' sidelying 10 sidelying  10' sidelying 10' sidelying  10' sidelying 10' sidelying 10' sidelying

## 2024-11-25 ENCOUNTER — OFFICE VISIT (OUTPATIENT)
Dept: PHYSICAL THERAPY | Facility: REHABILITATION | Age: 74
End: 2024-11-25
Payer: MEDICARE

## 2024-11-25 DIAGNOSIS — M16.12 OSTEOARTHRITIS OF LEFT HIP, UNSPECIFIED OSTEOARTHRITIS TYPE: Primary | ICD-10-CM

## 2024-11-25 PROCEDURE — 97110 THERAPEUTIC EXERCISES: CPT | Performed by: PHYSICAL THERAPIST

## 2024-11-25 PROCEDURE — 97112 NEUROMUSCULAR REEDUCATION: CPT | Performed by: PHYSICAL THERAPIST

## 2024-11-25 PROCEDURE — 97140 MANUAL THERAPY 1/> REGIONS: CPT | Performed by: PHYSICAL THERAPIST

## 2024-11-25 NOTE — PROGRESS NOTES
"Daily Note     Today's date: 2024  Patient name: Trinh Christopher  : 1950  MRN: [id number]  Referring provider: Kole Peralta*  Dx:   Encounter Diagnosis     ICD-10-CM    1. Osteoarthritis of left hip, unspecified osteoarthritis type  M16.12           Start Time: 1623  Stop Time: 1721  Total time in clinic (min): 58 minutes    Subjective: Patient with no new complaints.      Objective: See treatment diary below      Assessment: Tolerated treatment well. Patient demonstrated fatigue post treatment, exhibited good technique with therapeutic exercises, and would benefit from continued PT. No complaints of pain throughout session and overall good tolerance to TE.       Plan: Continue per plan of care.      Precautions: HTN, bilateral TKAs      Manuals 10/3 10/10 10/21 10/24 10/30 11/5 11/11 11/21 11/25   L hip PROM            L LAD            L glute/piriformis STM Done  Done Done Done  Done  Done  Done Done  Done               Neuro Re-Ed            Supine iso clamshells Supine GTB 3x10 b/l Supine GTB 3x10 b/l Supine GTB 2x10 b/l Supine GTB 3x10 b/l Supine GTB 3x10 b/l Supine GTB 3x10 b/l Supine GTB 3x12 b/l Supine GTB 3x12 b/l Supine BTB 3x12 b/l   Bridging 2x10 shallow 3x10 shallow 2x10 shallow 2x10 shallow 3x10 shallow 3x10 shallow 3x10 shallow  3x10 shallow 3x10 shallow   Standing hip abd 3x12 b/l 3x12 b/l 2x10 b/l 3x10 b/l 3x10 b/l 3x10 b/l 3x10 b/l 3x10 b/l 3x10 b/l   Standing hip ext 3x12 b/l 3x12 b/l 2x19 b/l 3x10 b/l 310 b/l 3x10 b/l 3x10 b/l 3x10 b/l 3x10 b/l   TB side stepping            SLS                        Ther Ex            Bike - strength/ROM 5' lvl 1 5' lv 1 5' lvl 0 5' lvl 1 5' lv 1 5' lvl 1 5' lv 1 5' lv 1  5' lv 1   Seated HS stretch 3x30'' b/l 3x30\" b/l 3x30\" b/l 3x30'' b/l 3x30\" b/l  3x30\" b/l 3x30\" b/l 3x30\" b/l  3x30\" b/l   LTR 20x5'' 20x5\" 20x5\" 20x5'' 20x5\" 20x5\" 20x5\" 20x5\" 20x5\"    Heel raises        3x10 3x10   Gastroc st 1/2 foam        3x30\" b/l 3x30\" b/l    "            Ther Activity            STS w TB at hips for glute drive            Step ups            Lateral step ups            Mini squats            Pt edu            Modalities            MHP 10' sidelying 10 sidelying  10' sidelying 10' sidelying  10' sidelying 10' sidelying 10' sidelying 10' sidelying

## 2024-12-02 ENCOUNTER — APPOINTMENT (OUTPATIENT)
Dept: PHYSICAL THERAPY | Facility: REHABILITATION | Age: 74
End: 2024-12-02
Payer: MEDICARE

## 2024-12-04 ENCOUNTER — OFFICE VISIT (OUTPATIENT)
Dept: PHYSICAL THERAPY | Facility: REHABILITATION | Age: 74
End: 2024-12-04
Payer: MEDICARE

## 2024-12-04 DIAGNOSIS — M16.12 OSTEOARTHRITIS OF LEFT HIP, UNSPECIFIED OSTEOARTHRITIS TYPE: Primary | ICD-10-CM

## 2024-12-04 PROCEDURE — 97112 NEUROMUSCULAR REEDUCATION: CPT

## 2024-12-04 PROCEDURE — 97110 THERAPEUTIC EXERCISES: CPT

## 2024-12-04 PROCEDURE — 97140 MANUAL THERAPY 1/> REGIONS: CPT

## 2024-12-04 NOTE — PROGRESS NOTES
"Daily Note     Today's date: 2024  Patient name: Trinh Christopher  : 1950  MRN: [id number]  Referring provider: Kole Peralta*  Dx:   Encounter Diagnosis     ICD-10-CM    1. Osteoarthritis of left hip, unspecified osteoarthritis type  M16.12           Start Time: 1618  Stop Time: 1715  Total time in clinic (min): 57 minutes    Subjective: Patient reports her hip has been good as of late.       Objective: See treatment diary below      Assessment: Tolerated treatment well. Patient demonstrated fatigue post treatment, exhibited good technique with therapeutic exercises, and would benefit from continued PT      Plan: Continue per plan of care.  Progress treatment as tolerated.       Precautions: HTN, bilateral TKAs      Manuals 12/4  10/21 10/24 10/30 11/5 11/11 11/21 11/25   L hip PROM            L LAD            L glute/piriformis STM Done   Done Done  Done  Done  Done Done  Done               Neuro Re-Ed            Supine iso clamshells Supine BTB 3x12 b/l  Supine GTB 2x10 b/l Supine GTB 3x10 b/l Supine GTB 3x10 b/l Supine GTB 3x10 b/l Supine GTB 3x12 b/l Supine GTB 3x12 b/l Supine BTB 3x12 b/l   Bridging 3x10 shallow  2x10 shallow 2x10 shallow 3x10 shallow 3x10 shallow 3x10 shallow  3x10 shallow 3x10 shallow   Standing hip abd 3x10 b/l  2x10 b/l 3x10 b/l 3x10 b/l 3x10 b/l 3x10 b/l 3x10 b/l 3x10 b/l   Standing hip ext 3x10 b/l  2x19 b/l 3x10 b/l 310 b/l 3x10 b/l 3x10 b/l 3x10 b/l 3x10 b/l   TB side stepping            SLS                        Ther Ex            Bike - strength/ROM 5' lvl 1  5' lvl 0 5' lvl 1 5' lv 1 5' lvl 1 5' lv 1 5' lv 1  5' lv 1   Seated HS stretch 3x30'' b/l  3x30\" b/l 3x30'' b/l 3x30\" b/l  3x30\" b/l 3x30\" b/l 3x30\" b/l  3x30\" b/l   LTR 20x5''  20x5\" 20x5'' 20x5\" 20x5\" 20x5\" 20x5\" 20x5\"    Heel raises 3x10       3x10 3x10   Gastroc st 1/2 foam        3x30\" b/l 3x30\" b/l               Ther Activity            STS w TB at hips for glute drive            Step ups         "    Lateral step ups            Mini squats            Pt edu            Modalities            MHP 10' sidelying   10' sidelying 10' sidelying  10' sidelying 10' sidelying 10' sidelying 10' sidelying

## 2024-12-06 ENCOUNTER — CATARACT EVALUATION (OUTPATIENT)
Dept: URBAN - METROPOLITAN AREA CLINIC 6 | Facility: CLINIC | Age: 74
End: 2024-12-06

## 2024-12-06 DIAGNOSIS — H26.492: ICD-10-CM

## 2024-12-06 DIAGNOSIS — Z96.1: ICD-10-CM

## 2024-12-06 DIAGNOSIS — H43.811: ICD-10-CM

## 2024-12-06 DIAGNOSIS — H04.123: ICD-10-CM

## 2024-12-06 DIAGNOSIS — H25.811: ICD-10-CM

## 2024-12-06 PROCEDURE — 99214 OFFICE O/P EST MOD 30 MIN: CPT

## 2024-12-06 ASSESSMENT — VISUAL ACUITY
OD_GLARE: 20/400
OD_PH: 20/50
OD_SC: 20/60-2
OS_SC: 20/30-1
OS_GLARE: 20/400
OU_CC: J1+

## 2024-12-06 ASSESSMENT — TONOMETRY
OD_IOP_MMHG: 12
OS_IOP_MMHG: 11

## 2024-12-09 ENCOUNTER — OFFICE VISIT (OUTPATIENT)
Dept: PHYSICAL THERAPY | Facility: REHABILITATION | Age: 74
End: 2024-12-09
Payer: MEDICARE

## 2024-12-09 DIAGNOSIS — M16.12 OSTEOARTHRITIS OF LEFT HIP, UNSPECIFIED OSTEOARTHRITIS TYPE: Primary | ICD-10-CM

## 2024-12-09 PROCEDURE — 97110 THERAPEUTIC EXERCISES: CPT | Performed by: PHYSICAL THERAPIST

## 2024-12-09 PROCEDURE — 97112 NEUROMUSCULAR REEDUCATION: CPT | Performed by: PHYSICAL THERAPIST

## 2024-12-09 PROCEDURE — 97140 MANUAL THERAPY 1/> REGIONS: CPT | Performed by: PHYSICAL THERAPIST

## 2024-12-09 NOTE — PROGRESS NOTES
"Daily Note     Today's date: 2024  Patient name: Trinh Christopher  : 1950  MRN: [id number]  Referring provider: Kole Peralta*  Dx:   Encounter Diagnosis     ICD-10-CM    1. Osteoarthritis of left hip, unspecified osteoarthritis type  M16.12           Start Time: 1404  Stop Time: 1455  Total time in clinic (min): 51 minutes    Subjective: Patient reports some pain in the left groin.      Objective: See treatment diary below      Assessment: Tolerated treatment well. Patient demonstrated fatigue post treatment, exhibited good technique with therapeutic exercises, and would benefit from continued PT. Positive response to manual therapy and exercises this visit.      Plan: Continue per plan of care.      Precautions: HTN, bilateral TKAs      Manuals    L hip PROM            L LAD            L glute/piriformis STM Done  Done    Done  Done Done  Done               Neuro Re-Ed            Supine iso clamshells Supine BTB 3x12 b/l BTB 3x12 b/l    Supine GTB 3x10 b/l Supine GTB 3x12 b/l Supine GTB 3x12 b/l Supine BTB 3x12 b/l   Bridging 3x10 shallow 3x12 shallow    3x10 shallow 3x10 shallow  3x10 shallow 3x10 shallow   Standing hip abd 3x10 b/l 3x10 b/l    3x10 b/l 3x10 b/l 3x10 b/l 3x10 b/l   Standing hip ext 3x10 b/l 3x10 b/l    3x10 b/l 3x10 b/l 3x10 b/l 3x10 b/l   TB side stepping            SLS                        Ther Ex            Bike - strength/ROM 5' lvl 1 5' lv 1    5' lvl 1 5' lv 1 5' lv 1  5' lv 1   Seated HS stretch 3x30'' b/l 3x30\" b/l    3x30\" b/l 3x30\" b/l 3x30\" b/l  3x30\" b/l   LTR 20x5'' 20x5\"    20x5\" 20x5\" 20x5\" 20x5\"    Heel raises 3x10 3x12      3x10 3x10   Gastroc st 1/2 foam        3x30\" b/l 3x30\" b/l               Ther Activity            STS w TB at hips for glute drive            Step ups            Lateral step ups            Mini squats            Pt edu            Modalities            MHP 10' sidelying 10' sidelying    10' " sidelying 10' sidelying 10' sidelying 10' sidelying

## 2024-12-16 ENCOUNTER — OFFICE VISIT (OUTPATIENT)
Dept: PHYSICAL THERAPY | Facility: REHABILITATION | Age: 74
End: 2024-12-16
Payer: MEDICARE

## 2024-12-16 DIAGNOSIS — M16.12 OSTEOARTHRITIS OF LEFT HIP, UNSPECIFIED OSTEOARTHRITIS TYPE: Primary | ICD-10-CM

## 2024-12-16 PROCEDURE — 97110 THERAPEUTIC EXERCISES: CPT

## 2024-12-16 PROCEDURE — 97112 NEUROMUSCULAR REEDUCATION: CPT

## 2024-12-16 PROCEDURE — 97140 MANUAL THERAPY 1/> REGIONS: CPT

## 2024-12-16 NOTE — PROGRESS NOTES
"Daily Note     Today's date: 2024  Patient name: Trinh Christopher  : 1950  MRN: [id number]  Referring provider: Kole Peralta*  Dx:   Encounter Diagnosis     ICD-10-CM    1. Osteoarthritis of left hip, unspecified osteoarthritis type  M16.12           Start Time: 1220  Stop Time: 1315  Total time in clinic (min): 55 minutes    Subjective: Patient reports her hip is feeling \"good, about the same\" at start of session. She states she will be following up with MD next Monday. No complaints noted after previous session.       Objective: See treatment diary below      Assessment: Tolerated treatment well. Patient demonstrated fatigue post treatment, exhibited good technique with therapeutic exercises, and would benefit from continued PT      Plan: Continue per plan of care.  Progress treatment as tolerated.       Precautions: HTN, bilateral TKAs      Manuals    L hip PROM            L LAD            L glute/piriformis STM Done  Done Done    Done  Done Done  Done               Neuro Re-Ed            Supine iso clamshells Supine BTB 3x12 b/l BTB 3x12 b/l BTB 3x12 b/l   Supine GTB 3x10 b/l Supine GTB 3x12 b/l Supine GTB 3x12 b/l Supine BTB 3x12 b/l   Bridging 3x10 shallow 3x12 shallow 3x12 shallow   3x10 shallow 3x10 shallow  3x10 shallow 3x10 shallow   Standing hip abd 3x10 b/l 3x10 b/l 3x12 b/l   3x10 b/l 3x10 b/l 3x10 b/l 3x10 b/l   Standing hip ext 3x10 b/l 3x10 b/l 3x12 b/l   3x10 b/l 3x10 b/l 3x10 b/l 3x10 b/l   TB side stepping            SLS                        Ther Ex            Bike - strength/ROM 5' lvl 1 5' lv 1 5' lvl 1   5' lvl 1 5' lv 1 5' lv 1  5' lv 1   Seated HS stretch 3x30'' b/l 3x30\" b/l 3x30'' b/l   3x30\" b/l 3x30\" b/l 3x30\" b/l  3x30\" b/l   LTR 20x5'' 20x5\" 20x5''   20x5\" 20x5\" 20x5\" 20x5\"    Heel raises 3x10 3x12 3x12     3x10 3x10   Gastroc st 1/2 foam        3x30\" b/l 3x30\" b/l               Ther Activity            STS w TB at " hips for glute drive            Step ups            Lateral step ups            Mini squats            Pt edu            Modalities            MHP 10' sidelying 10' sidelying 10' sidelying    10' sidelying 10' sidelying 10' sidelying 10' sidelying

## 2024-12-23 ENCOUNTER — OFFICE VISIT (OUTPATIENT)
Dept: PHYSICAL THERAPY | Facility: REHABILITATION | Age: 74
End: 2024-12-23
Payer: MEDICARE

## 2024-12-23 DIAGNOSIS — M16.12 OSTEOARTHRITIS OF LEFT HIP, UNSPECIFIED OSTEOARTHRITIS TYPE: Primary | ICD-10-CM

## 2024-12-23 PROCEDURE — 97140 MANUAL THERAPY 1/> REGIONS: CPT

## 2024-12-23 NOTE — PROGRESS NOTES
"Daily Note     Today's date: 2024  Patient name: Trinh Christopher  : 1950  MRN: [id number]  Referring provider: Kole Peralta*  Dx:   Encounter Diagnosis     ICD-10-CM    1. Osteoarthritis of left hip, unspecified osteoarthritis type  M16.12           Start Time: 1710  Stop Time: 1745  Total time in clinic (min): 35 minutes    Subjective: Patient reports soreness today secondary to running a lot of errands. She states she followed up with MD today, surgery is set for .       Objective: See treatment diary below      Assessment: Tolerated treatment well. Patient demonstrated fatigue post treatment, exhibited good technique with therapeutic exercises, and would benefit from continued PT Did not complete certain TE secondary to patient requesting to leave session early. Patient reports relief with manuals.       Plan: Continue per plan of care.  Progress treatment as tolerated.       Precautions: HTN, bilateral TKAs      Manuals    L hip PROM            L LAD            L glute/piriformis STM Done  Done Done  Done   Done  Done Done  Done               Neuro Re-Ed            Supine iso clamshells Supine BTB 3x12 b/l BTB 3x12 b/l BTB 3x12 b/l   Supine GTB 3x10 b/l Supine GTB 3x12 b/l Supine GTB 3x12 b/l Supine BTB 3x12 b/l   Bridging 3x10 shallow 3x12 shallow 3x12 shallow   3x10 shallow 3x10 shallow  3x10 shallow 3x10 shallow   Standing hip abd 3x10 b/l 3x10 b/l 3x12 b/l   3x10 b/l 3x10 b/l 3x10 b/l 3x10 b/l   Standing hip ext 3x10 b/l 3x10 b/l 3x12 b/l   3x10 b/l 3x10 b/l 3x10 b/l 3x10 b/l   TB side stepping            SLS                        Ther Ex            Bike - strength/ROM 5' lvl 1 5' lv 1 5' lvl 1 5' lvl   5' lvl 1 5' lv 1 5' lv 1  5' lv 1   Seated HS stretch 3x30'' b/l 3x30\" b/l 3x30'' b/l   3x30\" b/l 3x30\" b/l 3x30\" b/l  3x30\" b/l   LTR 20x5'' 20x5\" 20x5''   20x5\" 20x5\" 20x5\" 20x5\"    Heel raises 3x10 3x12 3x12     3x10 " "3x10   Gastroc st 1/2 foam        3x30\" b/l 3x30\" b/l               Ther Activity            STS w TB at hips for glute drive            Step ups            Lateral step ups            Mini squats            Pt edu            Modalities            MHP 10' sidelying 10' sidelying 10' sidelying  Done   10' sidelying 10' sidelying 10' sidelying 10' sidelying                                                            "

## 2024-12-30 ENCOUNTER — OFFICE VISIT (OUTPATIENT)
Dept: PHYSICAL THERAPY | Facility: REHABILITATION | Age: 74
End: 2024-12-30
Payer: MEDICARE

## 2024-12-30 DIAGNOSIS — M16.12 OSTEOARTHRITIS OF LEFT HIP, UNSPECIFIED OSTEOARTHRITIS TYPE: Primary | ICD-10-CM

## 2024-12-30 PROCEDURE — 97140 MANUAL THERAPY 1/> REGIONS: CPT

## 2024-12-30 PROCEDURE — 97112 NEUROMUSCULAR REEDUCATION: CPT

## 2024-12-30 PROCEDURE — 97110 THERAPEUTIC EXERCISES: CPT

## 2024-12-30 NOTE — PROGRESS NOTES
"Daily Note     Today's date: 2024  Patient name: Trinh Christopher  : 1950  MRN: [id number]  Referring provider: Kole Peralta*  Dx:   Encounter Diagnosis     ICD-10-CM    1. Osteoarthritis of left hip, unspecified osteoarthritis type  M16.12           Start Time: 1618  Stop Time: 1715  Total time in clinic (min): 57 minutes    Subjective: Patient reports her back is a little bothersome at start of session today.       Objective: See treatment diary below      Assessment: Tolerated treatment well. Patient demonstrated fatigue post treatment, exhibited good technique with therapeutic exercises, and would benefit from continued PT Continues to report relief with manuals and heat. No pain noted throughout session with TE, only muscle fatigue.       Plan: Continue per plan of care.  Progress treatment as tolerated.       Precautions: HTN, bilateral TKAs      Manuals    L hip PROM            L LAD            L glute/piriformis STM Done  Done Done  Done  Done   Done Done  Done               Neuro Re-Ed            Supine iso clamshells Supine BTB 3x12 b/l BTB 3x12 b/l BTB 3x12 b/l  BTB 3x15 b/l  Supine GTB 3x12 b/l Supine GTB 3x12 b/l Supine BTB 3x12 b/l   Bridging 3x10 shallow 3x12 shallow 3x12 shallow  3x12 shallow  3x10 shallow  3x10 shallow 3x10 shallow   Standing hip abd 3x10 b/l 3x10 b/l 3x12 b/l  3x12 b/l  3x10 b/l 3x10 b/l 3x10 b/l   Standing hip ext 3x10 b/l 3x10 b/l 3x12 b/l  3x12  3x10 b/l 3x10 b/l 3x10 b/l   TB side stepping            SLS                        Ther Ex            Bike - strength/ROM 5' lvl 1 5' lv 1 5' lvl 1 5' lvl  5' lvl 1  5' lv 1 5' lv 1  5' lv 1   Seated HS stretch 3x30'' b/l 3x30\" b/l 3x30'' b/l  3x30'' b/l  3x30\" b/l 3x30\" b/l  3x30\" b/l   LTR 20x5'' 20x5\" 20x5''  20x5''  20x5\" 20x5\" 20x5\"    Heel raises 3x10 3x12 3x12  3x12   3x10 3x10   Gastroc st 1/2 foam        3x30\" b/l 3x30\" b/l               Ther Activity      "       STS w TB at hips for glute drive            Step ups            Lateral step ups            Mini squats            Pt edu            Modalities            MHP 10' sidelying 10' sidelying 10' sidelying  Done  Done   10' sidelying 10' sidelying 10' sidelying

## 2025-01-06 ENCOUNTER — OFFICE VISIT (OUTPATIENT)
Dept: PHYSICAL THERAPY | Facility: REHABILITATION | Age: 75
End: 2025-01-06
Payer: MEDICARE

## 2025-01-06 DIAGNOSIS — M16.12 OSTEOARTHRITIS OF LEFT HIP, UNSPECIFIED OSTEOARTHRITIS TYPE: Primary | ICD-10-CM

## 2025-01-06 PROCEDURE — 97112 NEUROMUSCULAR REEDUCATION: CPT

## 2025-01-06 PROCEDURE — 97140 MANUAL THERAPY 1/> REGIONS: CPT

## 2025-01-06 PROCEDURE — 97110 THERAPEUTIC EXERCISES: CPT

## 2025-01-06 NOTE — PROGRESS NOTES
"Daily Note     Today's date: 2025  Patient name: Trinh Christopher  : 1950  MRN: [id number]  Referring provider: Kole Peralta*  Dx:   Encounter Diagnosis     ICD-10-CM    1. Osteoarthritis of left hip, unspecified osteoarthritis type  M16.12           Start Time: 1445  Stop Time: 1540  Total time in clinic (min): 55 minutes    Subjective: Patient reports hip as \"good\" at start of session.       Objective: See treatment diary below      Assessment: Tolerated treatment well. Patient demonstrated fatigue post treatment, exhibited good technique with therapeutic exercises, and would benefit from continued PT Good tolerance to addition of TB for standing hip abd/add. Fatigues quickly with sit to stands.       Plan: Continue per plan of care.  Progress treatment as tolerated.       Precautions: HTN, bilateral TKAs      Manuals    L hip PROM            L LAD            L glute/piriformis STM Done  Done Done  Done  Done  Done   Done  Done               Neuro Re-Ed            Supine iso clamshells Supine BTB 3x12 b/l BTB 3x12 b/l BTB 3x12 b/l  BTB 3x15 b/l BTB 3x15 b/l  Supine GTB 3x12 b/l Supine BTB 3x12 b/l   Bridging 3x10 shallow 3x12 shallow 3x12 shallow  3x12 shallow 3x15 shallow  3x10 shallow 3x10 shallow   Standing hip abd 3x10 b/l 3x10 b/l 3x12 b/l  3x12 b/l Pink TB 3x10 b/l  3x10 b/l 3x10 b/l   Standing hip ext 3x10 b/l 3x10 b/l 3x12 b/l  3x12 Pink 3x10 b/l  3x10 b/l 3x10 b/l   TB side stepping            SLS                        Ther Ex            Bike - strength/ROM 5' lvl 1 5' lv 1 5' lvl 1 5' lvl  5' lvl 1 5' lvl 1  5' lv 1  5' lv 1   Seated HS stretch 3x30'' b/l 3x30\" b/l 3x30'' b/l  3x30'' b/l 3x30''   3x30\" b/l  3x30\" b/l   LTR 20x5'' 20x5\" 20x5''  20x5'' 20x5''  20x5\" 20x5\"    Heel raises 3x10 3x12 3x12  3x12 3x15  3x10 3x10   Gastroc st 1/2 foam        3x30\" b/l 3x30\" b/l               Ther Activity            STS w TB at hips for glute " drive      X15, trial boost nv?      Step ups            Lateral step ups            Mini squats            Pt edu            Modalities            MHP 10' sidelying 10' sidelying 10' sidelying  Done  Done  Done   10' sidelying 10' sidelying

## 2025-01-13 ENCOUNTER — APPOINTMENT (OUTPATIENT)
Dept: PHYSICAL THERAPY | Facility: REHABILITATION | Age: 75
End: 2025-01-13
Payer: MEDICARE

## 2025-01-16 ENCOUNTER — OFFICE VISIT (OUTPATIENT)
Dept: PHYSICAL THERAPY | Facility: REHABILITATION | Age: 75
End: 2025-01-16
Payer: MEDICARE

## 2025-01-16 DIAGNOSIS — M16.12 OSTEOARTHRITIS OF LEFT HIP, UNSPECIFIED OSTEOARTHRITIS TYPE: Primary | ICD-10-CM

## 2025-01-16 PROCEDURE — 97112 NEUROMUSCULAR REEDUCATION: CPT

## 2025-01-16 PROCEDURE — 97110 THERAPEUTIC EXERCISES: CPT

## 2025-01-16 PROCEDURE — 97140 MANUAL THERAPY 1/> REGIONS: CPT

## 2025-01-16 NOTE — PROGRESS NOTES
"Daily Note     Today's date: 2025  Patient name: Trinh Christopher  : 1950  MRN: [id number]  Referring provider: Kole Peralta*  Dx:   Encounter Diagnosis     ICD-10-CM    1. Osteoarthritis of left hip, unspecified osteoarthritis type  M16.12           Start Time: 1400  Stop Time: 1456  Total time in clinic (min): 56 minutes    Subjective: Patient reporting some hip soreness at start of session.       Objective: See treatment diary below      Assessment: Tolerated treatment well. Patient demonstrated fatigue post treatment, exhibited good technique with therapeutic exercises, and would benefit from continued PT Good tolerance to addition of TB side stepping, appropriate challenge noted.      Plan: Continue per plan of care.  Progress treatment as tolerated.       Precautions: HTN, bilateral TKAs      Manuals    L hip PROM            L LAD            L glute/piriformis STM Done  Done Done  Done  Done  Done  Done   Done               Neuro Re-Ed            Supine iso clamshells Supine BTB 3x12 b/l BTB 3x12 b/l BTB 3x12 b/l  BTB 3x15 b/l BTB 3x15 b/l BTB 3x15 b/l  Supine BTB 3x12 b/l   Bridging 3x10 shallow 3x12 shallow 3x12 shallow  3x12 shallow 3x15 shallow 3x15 shallow  3x10 shallow   Standing hip abd 3x10 b/l 3x10 b/l 3x12 b/l  3x12 b/l Pink TB 3x10 b/l Pink TB 3x10 b/l  3x10 b/l   Standing hip ext 3x10 b/l 3x10 b/l 3x12 b/l  3x12 Pink 3x10 b/l Pink 3x10 b/l  3x10 b/l   TB side stepping       Pink TB 4 laps table     SLS                        Ther Ex            Bike - strength/ROM 5' lvl 1 5' lv 1 5' lvl 1 5' lvl  5' lvl 1 5' lvl 1 5' lvl 1  5' lv 1   Seated HS stretch 3x30'' b/l 3x30\" b/l 3x30'' b/l  3x30'' b/l 3x30''    3x30\" b/l   LTR 20x5'' 20x5\" 20x5''  20x5'' 20x5'' 20x5''  20x5\"    Heel raises 3x10 3x12 3x12  3x12 3x15 3x15  3x10   Gastroc st 1/2 foam         3x30\" b/l               Ther Activity            STS w TB at hips for glute drive   "    X15, trial boost nv? nv     Step ups            Lateral step ups            Mini squats            Pt edu            Modalities            MHP 10' sidelying 10' sidelying 10' sidelying  Done  Done  Done  Done   10' sidelying

## 2025-01-20 ENCOUNTER — APPOINTMENT (OUTPATIENT)
Dept: PHYSICAL THERAPY | Facility: REHABILITATION | Age: 75
End: 2025-01-20
Payer: MEDICARE

## 2025-01-27 ENCOUNTER — APPOINTMENT (OUTPATIENT)
Dept: PHYSICAL THERAPY | Facility: REHABILITATION | Age: 75
End: 2025-01-27
Payer: MEDICARE

## 2025-02-13 ENCOUNTER — EVALUATION (OUTPATIENT)
Dept: PHYSICAL THERAPY | Facility: REHABILITATION | Age: 75
End: 2025-02-13
Payer: MEDICARE

## 2025-02-13 DIAGNOSIS — M16.12 UNILATERAL PRIMARY OSTEOARTHRITIS, LEFT HIP: Primary | ICD-10-CM

## 2025-02-13 PROCEDURE — 97530 THERAPEUTIC ACTIVITIES: CPT | Performed by: PHYSICAL THERAPIST

## 2025-02-13 PROCEDURE — 97161 PT EVAL LOW COMPLEX 20 MIN: CPT | Performed by: PHYSICAL THERAPIST

## 2025-02-13 NOTE — LETTER
2025    Scot Huntley MD  [address] [facility] [address]    Patient: Trinh Christopher   YOB: 1950   Date of Visit: 2025     Encounter Diagnosis     ICD-10-CM    1. Unilateral primary osteoarthritis, left hip  M16.12           Dear Dr. Huntley:    Thank you for your recent referral of Trinh Christopher. Please review the attached evaluation summary from Trinh's recent visit.     Please verify that you agree with the plan of care by signing the attached order.     If you have any questions or concerns, please do not hesitate to call.     I sincerely appreciate the opportunity to share in the care of one of your patients and hope to have another opportunity to work with you in the near future.       Sincerely,    Mansi García, PT      Referring Provider:      I certify that I have read the below Plan of Care and certify the need for these services furnished under this plan of treatment while under my care.                    Scot Huntley MD  [address] [facility] [address]  Via Fax: [phone]          PT Evaluation     Today's date: 2025  Patient name: Trinh Christopher  : 1950  MRN: [id number]  Referring provider: Scot Huntley MD  Dx:   Encounter Diagnosis     ICD-10-CM    1. Unilateral primary osteoarthritis, left hip  M16.12           Start Time: 1533  Stop Time: 1615  Total time in clinic (min): 42 minutes    Assessment  Impairments: abnormal gait, abnormal muscle tone, abnormal or restricted ROM, activity intolerance, impaired physical strength, lacks appropriate home exercise program, pain with function, weight-bearing intolerance and activity limitations    Assessment details: Trinh Christopher is a 74 y.o. female patient presenting with left hip pain pre L anterior DANICA to be performed by Dr. Huntley on 25. Patient is currently having difficulty with ambulation, stair negotiation, standing, biking and groin pain  "limiting her from activity. Reviewed post-op plan of care and expectations. Trinh presents with the impairments as listed above and would benefit from Physical Therapy to address these impairments, improved quality of life and to maximize function.             Goals  Short-Term Goals:   1. Patient will improve hip flexion range of motion to 100* 4-6 weeks.  2. Patient will demonstrate improved quad activation and quad control to perform straight leg raise without quad lag 6 weeks.    Long-Term Goals:   1. Patient will be able to ambulate community distances w/o AD upon discharge.  2. Patient will be able to negotiate steps independently upon discharge.  3. Patient will be independent with HEP at time of discharge.   4. Patient will demonstrate at least 4/5 LLE strength upon discharge.        Plan  Patient would benefit from: skilled physical therapy  Planned modality interventions: cryotherapy, TENS and electrical stimulation/French stimulation    Frequency: 2x week  Duration in weeks: 12  Plan of Care beginning date: 2/13/2025  Plan of Care expiration date: 5/8/2025  Treatment plan discussed with: patient        Subjective Evaluation    History of Present Illness  Mechanism of injury: Trinh Christopher is a 74 y.o. female presenting pre L anterior DANICA to be performed by Dr. Huntley on 2/18/25. Currently having difficulty with walking, stair negotiation, standing, squatting, lifting, riding a bike.      Home set-up and functional level:  Steps to enter home: No steps  First floor set up: Yes - half bathroom   Number of steps to second floor 13 RHR to kitchen and living area.    Adequate lighting, throw rugs: entrance rug   Assistive Devices: walker, shower chair, shower - no SPC   Assist at home:  - will be home to assist and daughter Serene   Pre op TUG time: 13\"    First post op f/u with MD: 3/13/25      Patient Goals  Patient goals for therapy: decreased pain, improved balance, increased motion, increased " "strength, independence with ADLs/IADLs, return to sport/leisure activities and return to work  Patient goal: painless walking, improved back and hip pain, return to all normal activities, walking for exercise, riding a bike  Pain  Current pain ratin  At best pain ratin  At worst pain ratin  Location: left groin and down left lateral leg  Quality: dull ache and sharp    Social Support  Steps to enter house: yes  Stairs in house: yes     Employment status: working  Treatments  Previous treatment: physical therapy, medication and injection treatment  Current treatment: medication and physical therapy        Objective     Active Range of Motion   Left Hip   Flexion: 90 degrees with pain  Abduction: 15 degrees with pain    Right Hip   Flexion: 100 degrees   Abduction: 25 degrees     Passive Range of Motion   Left Hip   Flexion: 90 degrees with pain    Right Hip   Flexion: 110 degrees   External rotation (90/90): WFL  Internal rotation (90/90): WFL    Additional Passive Range of Motion Details  L   ER 50%  IR 25%    Strength/Myotome Testing     Left Hip   Planes of Motion   Flexion: 3+  Extension: 2+  Abduction: 2+  External rotation: 3  Internal rotation: 3+    Right Hip   Planes of Motion   Flexion: 4  Extension: 2+  Abduction: 3+  External rotation: 4  Internal rotation: 4    Left Knee   Flexion: 5  Extension: 5    Right Knee   Flexion: 5  Extension: 5    Left Ankle/Foot   Dorsiflexion: 5    Right Ankle/Foot   Dorsiflexion: 5    Tests     Lumbar     Left   Negative passive SLR.     Right   Negative passive SLR.     General Comments:      Hip Comments   30\"STS: 10 reps standard chair   TU\"     Ambulating with decreased left stance time and right lateral trunk lean                  Precautions:   Past Medical History:   Diagnosis Date   • A-fib (HCC)     since epidural DCed pts been ok   • Anxiety    • Colon polyp    • GERD (gastroesophageal reflux disease)    • Hemorrhoids    • Hyperlipidemia    • " Hypertension      ANTERIOR DANICA 2/18/25     Daily Treatment Diary     Manuals 2/13            PROM L hip nv                         TE             Bike - ROM             Heel slides w strap nv            Gastroc stretch strap nv            Hamstring stretch nv                                                                             NMR             Quad sets nv            SAQ             LAQ             SLR flexion w/ quad set             SLR hip abduction              Bridging             Clamshells             Heel raises             SLS                                       Therapeutic Activity             Pt edu Done            Leg press              Step ups             Lateral step ups             STS                                                    Modalities             CP PRN

## 2025-02-13 NOTE — PROGRESS NOTES
PT Evaluation     Today's date: 2025  Patient name: Trinh Christopher  : 1950  MRN: [id number]  Referring provider: Scot Huntley MD  Dx:   Encounter Diagnosis     ICD-10-CM    1. Unilateral primary osteoarthritis, left hip  M16.12           Start Time: 1533  Stop Time: 1615  Total time in clinic (min): 42 minutes    Assessment  Impairments: abnormal gait, abnormal muscle tone, abnormal or restricted ROM, activity intolerance, impaired physical strength, lacks appropriate home exercise program, pain with function, weight-bearing intolerance and activity limitations    Assessment details: Trinh Christopher is a 74 y.o. female patient presenting with left hip pain pre L anterior DANICA to be performed by Dr. Huntley on 25. Patient is currently having difficulty with ambulation, stair negotiation, standing, biking and groin pain limiting her from activity. Reviewed post-op plan of care and expectations. Trinh presents with the impairments as listed above and would benefit from Physical Therapy to address these impairments, improved quality of life and to maximize function.             Goals  Short-Term Goals:   1. Patient will improve hip flexion range of motion to 100* 4-6 weeks.  2. Patient will demonstrate improved quad activation and quad control to perform straight leg raise without quad lag 6 weeks.    Long-Term Goals:   1. Patient will be able to ambulate community distances w/o AD upon discharge.  2. Patient will be able to negotiate steps independently upon discharge.  3. Patient will be independent with HEP at time of discharge.   4. Patient will demonstrate at least 4/5 LLE strength upon discharge.        Plan  Patient would benefit from: skilled physical therapy  Planned modality interventions: cryotherapy, TENS and electrical stimulation/[place] stimulation    Frequency: 2x week  Duration in weeks: 12  Plan of Care beginning date: 2025  Plan of Care expiration date: 2025  Treatment  "plan discussed with: patient        Subjective Evaluation    History of Present Illness  Mechanism of injury: Trinh Christopher is a 74 y.o. female presenting pre L anterior DANICA to be performed by Dr. Huntley on 25. Currently having difficulty with walking, stair negotiation, standing, squatting, lifting, riding a bike.      Home set-up and functional level:  Steps to enter home: No steps  First floor set up: Yes - half bathroom   Number of steps to second floor 13 RHR to kitchen and living area.    Adequate lighting, throw rugs: entrance rug   Assistive Devices: walker, shower chair, shower - no SPC   Assist at home:  - will be home to assist and daughter Serene   Pre op TUG time: 13\"    First post op f/u with MD: 3/13/25      Patient Goals  Patient goals for therapy: decreased pain, improved balance, increased motion, increased strength, independence with ADLs/IADLs, return to sport/leisure activities and return to work  Patient goal: painless walking, improved back and hip pain, return to all normal activities, walking for exercise, riding a bike  Pain  Current pain ratin  At best pain ratin  At worst pain ratin  Location: left groin and down left lateral leg  Quality: dull ache and sharp    Social Support  Steps to enter house: yes  Stairs in house: yes     Employment status: working  Treatments  Previous treatment: physical therapy, medication and injection treatment  Current treatment: medication and physical therapy        Objective     Active Range of Motion   Left Hip   Flexion: 90 degrees with pain  Abduction: 15 degrees with pain    Right Hip   Flexion: 100 degrees   Abduction: 25 degrees     Passive Range of Motion   Left Hip   Flexion: 90 degrees with pain    Right Hip   Flexion: 110 degrees   External rotation (90/90): WFL  Internal rotation (90/90): WFL    Additional Passive Range of Motion Details  L   ER 50%  IR 25%    Strength/Myotome Testing     Left Hip   Planes of Motion " "  Flexion: 3+  Extension: 2+  Abduction: 2+  External rotation: 3  Internal rotation: 3+    Right Hip   Planes of Motion   Flexion: 4  Extension: 2+  Abduction: 3+  External rotation: 4  Internal rotation: 4    Left Knee   Flexion: 5  Extension: 5    Right Knee   Flexion: 5  Extension: 5    Left Ankle/Foot   Dorsiflexion: 5    Right Ankle/Foot   Dorsiflexion: 5    Tests     Lumbar     Left   Negative passive SLR.     Right   Negative passive SLR.     General Comments:      Hip Comments   30\"STS: 10 reps standard chair   TU\"     Ambulating with decreased left stance time and right lateral trunk lean                  Precautions:   Past Medical History:   Diagnosis Date    A-fib (HCC)     since epidural DCed pts been ok    Anxiety     Colon polyp     GERD (gastroesophageal reflux disease)     Hemorrhoids     Hyperlipidemia     Hypertension      ANTERIOR DANICA 25     Daily Treatment Diary     Manuals             PROM L hip nv                         TE             Bike - ROM             Heel slides w strap nv            Gastroc stretch strap nv            Hamstring stretch nv                                                                             NMR             Quad sets nv            SAQ             LAQ             SLR flexion w/ quad set             SLR hip abduction              Bridging             Clamshells             Heel raises             SLS                                       Therapeutic Activity             Pt edu Done            Leg press              Step ups             Lateral step ups             STS                                                    Modalities             CP PRN                         "

## 2025-02-21 ENCOUNTER — OFFICE VISIT (OUTPATIENT)
Dept: PHYSICAL THERAPY | Facility: REHABILITATION | Age: 75
End: 2025-02-21
Payer: MEDICARE

## 2025-02-21 DIAGNOSIS — Z96.642 STATUS POST TOTAL REPLACEMENT OF LEFT HIP: Primary | ICD-10-CM

## 2025-02-21 DIAGNOSIS — M16.12 UNILATERAL PRIMARY OSTEOARTHRITIS, LEFT HIP: ICD-10-CM

## 2025-02-21 PROCEDURE — 97110 THERAPEUTIC EXERCISES: CPT | Performed by: PHYSICAL THERAPIST

## 2025-02-21 PROCEDURE — 97164 PT RE-EVAL EST PLAN CARE: CPT | Performed by: PHYSICAL THERAPIST

## 2025-02-21 PROCEDURE — 97530 THERAPEUTIC ACTIVITIES: CPT | Performed by: PHYSICAL THERAPIST

## 2025-02-21 NOTE — PROGRESS NOTES
PT Re-Evaluation     Today's date: 2025  Patient name: Trinh Christopher  : 1950  MRN: [id number]  Referring provider: Scot Huntley MD  Dx:   Encounter Diagnosis     ICD-10-CM    1. Status post total replacement of left hip  Z96.642       2. Unilateral primary osteoarthritis, left hip  M16.12             Start Time: 0937  Stop Time: 1024  Total time in clinic (min): 47 minutes    Assessment  Impairments: abnormal gait, abnormal muscle tone, abnormal or restricted ROM, activity intolerance, impaired physical strength, lacks appropriate home exercise program, pain with function, weight-bearing intolerance and activity limitations    Assessment details: RE performed on 25  Trinh Christopher is a 74 y.o. female patient presenting with left hip pain s/p left anterior DANICA performed by Dr. Huntley on 25. Patient is currently having difficulty with ambulation, stair negotiation, driving, swelling, pain, getting in/out of car, shower and bed. Pain is relieved or reduced with medication, rest and ice.  Pt would like to return to being able to walk, drive, bike ride and return to all desired normal activities including work. Next follow up with the physician is 3/13/25.    Reviewed symptoms of DVT and infection with patient with verbalized understanding and compliance. Educated patient on walking every hour, elevating leg and ankle pumps with icing and using looped strap or another person to help lift left leg for transfers.            Goals  Short-Term Goals:   1. Patient will improve hip flexion range of motion to 100* 4-6 weeks.  2. Patient will demonstrate improved quad activation and quad control to perform straight leg raise without quad lag 6 weeks.    Long-Term Goals:   1. Patient will be able to ambulate community distances w/o AD upon discharge.  2. Patient will be able to negotiate steps independently upon discharge.  3. Patient will be independent with HEP at time of discharge.   4. Patient  "will demonstrate at least 4/5 LLE strength upon discharge.        Plan  Patient would benefit from: skilled physical therapy  Planned modality interventions: cryotherapy, TENS and electrical stimulation/[place] stimulation    Frequency: 2x week  Duration in weeks: 12  Plan of Care beginning date: 2025  Plan of Care expiration date: 2025  Treatment plan discussed with: patient        Subjective Evaluation    History of Present Illness  Mechanism of injury: RE performed on 25  Patient reports to PT today post-operative left anterior DANICA performed on 25 by Dr. Huntley. Patient denies any infections or complications following surgery. No bleeding or drainage observed, overall good healing. Patient reports swelling in the left upper thigh. Next follow up with MD on 3/13/25.     Home set-up and functional level:  Steps to enter home: No steps  First floor set up: Yes - half bathroom   Number of steps to second floor 13 RHR to kitchen and living area.    Adequate lighting, throw rugs: entrance rug   Assistive Devices: walker, shower chair, shower - no SPC   Assist at home:  - will be home to assist and daughter Serene   Pre op TUG time: 13\"    First post op f/u with MD: 3/13/25      Patient Goals  Patient goals for therapy: decreased pain, improved balance, increased motion, increased strength, independence with ADLs/IADLs, return to sport/leisure activities and return to work  Patient goal: painless walking, improved back and hip pain, return to all normal activities, walking for exercise, riding a bike  Pain  Current pain ratin  At best pain ratin  At worst pain ratin  Location: left groin and down left lateral leg  Quality: dull ache and sharp    Social Support  Steps to enter house: yes  Stairs in house: yes     Employment status: working  Treatments  Previous treatment: physical therapy, medication and injection treatment  Current treatment: medication and physical " "therapy        Objective     Observations   Left Hip  Positive for edema and incision.     Additional Observation Details  Incision visualized - steri strips in place - small amount of dried blood but not active drainage or bleeding observed - no redness    Active Range of Motion   Left Hip   Flexion: 45 degrees with pain    Right Hip   Flexion: 100 degrees   Abduction: 25 degrees     Passive Range of Motion     Right Hip   Flexion: 110 degrees   External rotation (90/90): WFL  Internal rotation (90/90): WFL    Strength/Myotome Testing     Left Hip   Planes of Motion   Flexion: 1    Right Hip   Planes of Motion   Flexion: 4  Extension: 2+  Abduction: 3+  External rotation: 4  Internal rotation: 4    Left Knee   Flexion: 3  Extension: 3    Right Knee   Flexion: 5  Extension: 5    Left Ankle/Foot   Dorsiflexion: 5    Right Ankle/Foot   Dorsiflexion: 5    Tests     Lumbar     Left   Negative passive SLR.     Right   Negative passive SLR.     General Comments:      Hip Comments   RE performed on 25  Ambulating with RW, BUE weightbearing to offload during left stance, step to gait with heel strike    Pre-op IE:  30\"STS: 10 reps standard chair   TU\"     Ambulating with decreased left stance time and right lateral trunk lean                  Precautions:   Past Medical History:   Diagnosis Date    A-fib (HCC)     since epidural DCed pts been ok    Anxiety     Colon polyp     GERD (gastroesophageal reflux disease)     Hemorrhoids     Hyperlipidemia     Hypertension      ANTERIOR DANICA 25 - trim sutures post op day 10     Daily Treatment Diary     Manuals            PROM L hip nv nv                        TE             Bike - ROM             Heel slides w strap nv 10x5\"           Gastroc stretch strap nv Trialed no stretch           Hamstring stretch nv 3x20\"                                                                            NMR             Quad sets nv 2x10x5\"           Glute sets  " "2x10x5\"           SAQ             LAQ             SLR flexion w/ quad set             SLR hip abduction              Bridging             Clamshells             Heel raises  nv           SLS                                       Therapeutic Activity             Pt edu Done Done - POC, prognosis, HEP, icing           Leg press              Step ups             Lateral step ups             STS                                                    Modalities             CP PRN                         "

## 2025-02-21 NOTE — LETTER
2025    Scot Huntley MD  [address] [facility] [address]    Patient: Trinh Christopher   YOB: 1950   Date of Visit: 2025     Encounter Diagnosis     ICD-10-CM    1. Status post total replacement of left hip  Z96.642       2. Unilateral primary osteoarthritis, left hip  M16.12           Dear Dr. Huntley:    Thank you for your recent referral of Trinh Christopher. Please review the attached evaluation summary from Trinh's recent visit.     Please verify that you agree with the plan of care by signing the attached order.     If you have any questions or concerns, please do not hesitate to call.     I sincerely appreciate the opportunity to share in the care of one of your patients and hope to have another opportunity to work with you in the near future.       Sincerely,    Mansi García, PT      Referring Provider:      I certify that I have read the below Plan of Care and certify the need for these services furnished under this plan of treatment while under my care.                    Scot Huntley MD  [address] [place] [id number]  Via Fax: [phone]          PT Re-Evaluation     Today's date: 2025  Patient name: Trinh Christopher  : 1950  MRN: [id number]  Referring provider: Scot Huntley MD  Dx:   Encounter Diagnosis     ICD-10-CM    1. Status post total replacement of left hip  Z96.642       2. Unilateral primary osteoarthritis, left hip  M16.12             Start Time: 937  Stop Time: 1024  Total time in clinic (min): 47 minutes    Assessment  Impairments: abnormal gait, abnormal muscle tone, abnormal or restricted ROM, activity intolerance, impaired physical strength, lacks appropriate home exercise program, pain with function, weight-bearing intolerance and activity limitations    Assessment details: RE performed on 25  Trinh Christopher is a 74 y.o. female patient presenting with left hip pain s/p left anterior  DANICA performed by Dr. Huntley on 2/18/25. Patient is currently having difficulty with ambulation, stair negotiation, driving, swelling, pain, getting in/out of car, shower and bed. Pain is relieved or reduced with medication, rest and ice.  Pt would like to return to being able to walk, drive, bike ride and return to all desired normal activities including work. Next follow up with the physician is 3/13/25.    Reviewed symptoms of DVT and infection with patient with verbalized understanding and compliance. Educated patient on walking every hour, elevating leg and ankle pumps with icing and using looped strap or another person to help lift left leg for transfers.            Goals  Short-Term Goals:   1. Patient will improve hip flexion range of motion to 100* 4-6 weeks.  2. Patient will demonstrate improved quad activation and quad control to perform straight leg raise without quad lag 6 weeks.    Long-Term Goals:   1. Patient will be able to ambulate community distances w/o AD upon discharge.  2. Patient will be able to negotiate steps independently upon discharge.  3. Patient will be independent with HEP at time of discharge.   4. Patient will demonstrate at least 4/5 LLE strength upon discharge.        Plan  Patient would benefit from: skilled physical therapy  Planned modality interventions: cryotherapy, TENS and electrical stimulation/Filipino stimulation    Frequency: 2x week  Duration in weeks: 12  Plan of Care beginning date: 2/21/2025  Plan of Care expiration date: 5/16/2025  Treatment plan discussed with: patient        Subjective Evaluation    History of Present Illness  Mechanism of injury: RE performed on 02/21/25  Patient reports to PT today post-operative left anterior DANICA performed on 2/18/25 by Dr. Huntley. Patient denies any infections or complications following surgery. No bleeding or drainage observed, overall good healing. Patient reports swelling in the left upper thigh. Next follow up with MD on  "3/13/25.     Home set-up and functional level:  Steps to enter home: No steps  First floor set up: Yes - half bathroom   Number of steps to second floor 13 RHR to kitchen and living area.    Adequate lighting, throw rugs: entrance rug   Assistive Devices: walker, shower chair, shower - no SPC   Assist at home:  - will be home to assist and daughter Serene   Pre op TUG time: 13\"    First post op f/u with MD: 3/13/25      Patient Goals  Patient goals for therapy: decreased pain, improved balance, increased motion, increased strength, independence with ADLs/IADLs, return to sport/leisure activities and return to work  Patient goal: painless walking, improved back and hip pain, return to all normal activities, walking for exercise, riding a bike  Pain  Current pain ratin  At best pain ratin  At worst pain ratin  Location: left groin and down left lateral leg  Quality: dull ache and sharp    Social Support  Steps to enter house: yes  Stairs in house: yes     Employment status: working  Treatments  Previous treatment: physical therapy, medication and injection treatment  Current treatment: medication and physical therapy        Objective     Observations   Left Hip  Positive for edema and incision.     Additional Observation Details  Incision visualized - steri strips in place - small amount of dried blood but not active drainage or bleeding observed - no redness    Active Range of Motion   Left Hip   Flexion: 45 degrees with pain    Right Hip   Flexion: 100 degrees   Abduction: 25 degrees     Passive Range of Motion     Right Hip   Flexion: 110 degrees   External rotation (90/90): WFL  Internal rotation (90/90): WFL    Strength/Myotome Testing     Left Hip   Planes of Motion   Flexion: 1    Right Hip   Planes of Motion   Flexion: 4  Extension: 2+  Abduction: 3+  External rotation: 4  Internal rotation: 4    Left Knee   Flexion: 3  Extension: 3    Right Knee   Flexion: 5  Extension: 5    Left " "Ankle/Foot   Dorsiflexion: 5    Right Ankle/Foot   Dorsiflexion: 5    Tests     Lumbar     Left   Negative passive SLR.     Right   Negative passive SLR.     General Comments:      Hip Comments   RE performed on 25  Ambulating with RW, BUE weightbearing to offload during left stance, step to gait with heel strike    Pre-op IE:  30\"STS: 10 reps standard chair   TU\"     Ambulating with decreased left stance time and right lateral trunk lean                  Precautions:   Past Medical History:   Diagnosis Date   • A-fib (HCC)     since epidural DCed pts been ok   • Anxiety    • Colon polyp    • GERD (gastroesophageal reflux disease)    • Hemorrhoids    • Hyperlipidemia    • Hypertension      ANTERIOR DANICA 25 - trim sutures post op day 10     Daily Treatment Diary     Manuals            PROM L hip nv nv                        TE             Bike - ROM             Heel slides w strap nv 10x5\"           Gastroc stretch strap nv Trialed no stretch           Hamstring stretch nv 3x20\"                                                                            NMR             Quad sets nv 2x10x5\"           Glute sets  2x10x5\"           SAQ             LAQ             SLR flexion w/ quad set             SLR hip abduction              Bridging             Clamshells             Heel raises  nv           SLS                                       Therapeutic Activity             Pt edu Done Done - POC, prognosis, HEP, icing           Leg press              Step ups             Lateral step ups             STS                                                    Modalities             CP PRN                                         "

## 2025-02-25 ENCOUNTER — OFFICE VISIT (OUTPATIENT)
Dept: PHYSICAL THERAPY | Facility: REHABILITATION | Age: 75
End: 2025-02-25
Payer: MEDICARE

## 2025-02-25 DIAGNOSIS — Z96.642 STATUS POST TOTAL REPLACEMENT OF LEFT HIP: Primary | ICD-10-CM

## 2025-02-25 DIAGNOSIS — M16.12 UNILATERAL PRIMARY OSTEOARTHRITIS, LEFT HIP: ICD-10-CM

## 2025-02-25 PROCEDURE — 97140 MANUAL THERAPY 1/> REGIONS: CPT | Performed by: PHYSICAL THERAPIST

## 2025-02-25 PROCEDURE — 97110 THERAPEUTIC EXERCISES: CPT | Performed by: PHYSICAL THERAPIST

## 2025-02-25 PROCEDURE — 97112 NEUROMUSCULAR REEDUCATION: CPT | Performed by: PHYSICAL THERAPIST

## 2025-02-25 NOTE — PROGRESS NOTES
"Daily Note     Today's date: 2025  Patient name: Trinh Christopher  : 1950  MRN: [id number]  Referring provider: Scot Huntley MD  Dx:   Encounter Diagnosis     ICD-10-CM    1. Status post total replacement of left hip  Z96.642       2. Unilateral primary osteoarthritis, left hip  M16.12           Start Time: 1053  Stop Time: 1134  Total time in clinic (min): 41 minutes    Subjective: Patient reports feeling a little better over the weekend.       Objective: See treatment diary below      Assessment: Tolerated treatment well. Initiated plan of care this visit with good tolerance. Some discomfort with hip PROM but good motion noted. Patient demonstrated fatigue post treatment, exhibited good technique with therapeutic exercises, and would benefit from continued PT.      Plan: Continue per plan of care.          Precautions:   Past Medical History:   Diagnosis Date    A-fib (HCC)     since epidural DCed pts been ok    Anxiety     Colon polyp     GERD (gastroesophageal reflux disease)     Hemorrhoids     Hyperlipidemia     Hypertension      ANTERIOR DANICA 25 - trim sutures post op day 10     Daily Treatment Diary     Manuals           PROM L hip - flx, gentle ER/IR, abd nv nv Done                        TE             Bike - ROM             Heel slides w strap nv 10x5\" 10x5\"          Gastroc stretch strap nv Trialed no stretch           Hamstring stretch nv 3x20\" 5x20\"                                                                           NMR             Quad sets nv 2x10x5\" 3x10x5\"          Glute sets  2x10x5\" 3x10x5\"          SAQ             LAQ   3x10          SLR flexion w/ quad set             SLR hip abduction              Bridging             Clamshells             Heel raises  nv 2x10          SLS                                       Therapeutic Activity             Pt edu Done Done - POC, prognosis, HEP, icing           Leg press              Step ups             Lateral " step ups             STS                                                    Modalities             CP PRN

## 2025-02-27 ENCOUNTER — OFFICE VISIT (OUTPATIENT)
Dept: PHYSICAL THERAPY | Facility: REHABILITATION | Age: 75
End: 2025-02-27
Payer: MEDICARE

## 2025-02-27 DIAGNOSIS — Z96.642 STATUS POST TOTAL REPLACEMENT OF LEFT HIP: Primary | ICD-10-CM

## 2025-02-27 DIAGNOSIS — M16.12 UNILATERAL PRIMARY OSTEOARTHRITIS, LEFT HIP: ICD-10-CM

## 2025-02-27 PROCEDURE — 97112 NEUROMUSCULAR REEDUCATION: CPT

## 2025-02-27 PROCEDURE — 97140 MANUAL THERAPY 1/> REGIONS: CPT

## 2025-02-27 PROCEDURE — 97110 THERAPEUTIC EXERCISES: CPT

## 2025-02-27 NOTE — PROGRESS NOTES
"Daily Note     Today's date: 2025  Patient name: Trinh Christopher  : 1950  MRN: [id number]  Referring provider: Scot Huntley MD  Dx:   Encounter Diagnosis     ICD-10-CM    1. Status post total replacement of left hip  Z96.642       2. Unilateral primary osteoarthritis, left hip  M16.12           Start Time: 1530  Stop Time: 1630  Total time in clinic (min): 60 minutes    Subjective: Patient reports having more hip pain today compared to yesterday.       Objective: See treatment diary below      Assessment: Tolerated treatment well. Patient demonstrated fatigue post treatment, exhibited good technique with therapeutic exercises, and would benefit from continued PT Overall good hip PROM noted. Trialed bike rocks today with good tolerance, able to complete full revolution after a few rocks. Practiced ambulation with RW, with patient educated on heel toe, step through gait pattern as well as to make sure she is not leaning forward, self awareness of erect posture and less reliance on RW, patient reporting understanding.        Plan: Continue per plan of care.  Progress treatment as tolerated.       Precautions:   Past Medical History:   Diagnosis Date    A-fib (HCC)     since epidural DCed pts been ok    Anxiety     Colon polyp     GERD (gastroesophageal reflux disease)     Hemorrhoids     Hyperlipidemia     Hypertension      ANTERIOR DANICA 25 - trim sutures post op day 10     Daily Treatment Diary     Manuals          PROM L hip - flx, gentle ER/IR, abd nv nv Done  Done                       TE             Bike - ROM    Rocks  to full 5'          Heel slides w strap nv 10x5\" 10x5\" 20x5''         Gastroc stretch strap nv Trialed no stretch           Hamstring stretch nv 3x20\" 5x20\" 5x20''                                                                          NMR             Quad sets nv 2x10x5\" 3x10x5\" 3x10x5''         Glute sets  2x10x5\" 3x10x5\" 3x10x5''         SAQ           "   LAQ   3x10 3x10         SLR flexion w/ quad set             SLR hip abduction              Bridging             Clamshells             Heel raises  nv 2x10 3x10         SLS                                       Therapeutic Activity             Pt edu Done Done - POC, prognosis, HEP, icing  Ambulation with RW         Leg press              Step ups             Lateral step ups             STS                                                    Modalities             CP PRN

## 2025-03-04 ENCOUNTER — OFFICE VISIT (OUTPATIENT)
Dept: PHYSICAL THERAPY | Facility: REHABILITATION | Age: 75
End: 2025-03-04
Payer: MEDICARE

## 2025-03-04 DIAGNOSIS — Z96.642 STATUS POST TOTAL REPLACEMENT OF LEFT HIP: Primary | ICD-10-CM

## 2025-03-04 DIAGNOSIS — M16.12 UNILATERAL PRIMARY OSTEOARTHRITIS, LEFT HIP: ICD-10-CM

## 2025-03-04 PROCEDURE — 97110 THERAPEUTIC EXERCISES: CPT | Performed by: PHYSICAL THERAPIST

## 2025-03-04 PROCEDURE — 97140 MANUAL THERAPY 1/> REGIONS: CPT | Performed by: PHYSICAL THERAPIST

## 2025-03-04 PROCEDURE — 97112 NEUROMUSCULAR REEDUCATION: CPT | Performed by: PHYSICAL THERAPIST

## 2025-03-04 NOTE — PROGRESS NOTES
"Daily Note     Today's date: 3/4/2025  Patient name: Trinh Christopher  : 1950  MRN: [id number]  Referring provider: Kole Peralta*  Dx:   Encounter Diagnosis     ICD-10-CM    1. Status post total replacement of left hip  Z96.642       2. Unilateral primary osteoarthritis, left hip  M16.12           Start Time: 1045  Stop Time: 1140  Total time in clinic (min): 55 minutes    Subjective: Patient has been ambulating with SPC. She reports overall feeling ok. She was able to get in and out of the car by herself.      Objective: See treatment diary below      Assessment: Tolerated treatment well. Progressed glute and quad strengthening this visit with good tolerance. Trimmed one suture flush to the skin superior to the incision as listed in post-op instructions. Patient demonstrated fatigue post treatment, exhibited good technique with therapeutic exercises, and would benefit from continued PT.       Plan: Continue per plan of care.      Precautions:   Past Medical History:   Diagnosis Date    A-fib (HCC)     since epidural DCed pts been ok    Anxiety     Colon polyp     GERD (gastroesophageal reflux disease)     Hemorrhoids     Hyperlipidemia     Hypertension      ANTERIOR DANICA 25 - trim sutures post op day 10     Daily Treatment Diary     Manuals 2/13 2/21 2/25 2/27 3/4        PROM L hip - flx, gentle ER/IR, abd nv nv Done  Done  Done - instructed         Suture trimming     Done        TE             Bike - ROM    Rocks  to full 5'  Rocks to full 5'        Heel slides w strap nv 10x5\" 10x5\" 20x5'' 10x5\"        Gastroc stretch strap nv Trialed no stretch           Hamstring stretch nv 3x20\" 5x20\" 5x20'' 5x20\"                                                                         NMR             Quad sets nv 2x10x5\" 3x10x5\" 3x10x5''         Glute sets  2x10x5\" 3x10x5\" 3x10x5''         SAQ             LAQ   3x10 3x10 2# 3x10        SLR flexion w/ quad set             SLR hip abduction   "            Standing hip ext     2x10 L        Standing hip abd     2x10 L        Bridging     2x10        Clamshells     Supine uni GTB 2x10 ea        Heel raises  nv 2x10 3x10 3x10        SLS                                       Therapeutic Activity             Pt edu Done Done - POC, prognosis, HEP, icing  Ambulation with RW Done        Leg press              Step ups             Lateral step ups             STS                                                    Modalities             CP PRN

## 2025-03-06 ENCOUNTER — OFFICE VISIT (OUTPATIENT)
Dept: PHYSICAL THERAPY | Facility: REHABILITATION | Age: 75
End: 2025-03-06
Payer: MEDICARE

## 2025-03-06 DIAGNOSIS — M16.12 UNILATERAL PRIMARY OSTEOARTHRITIS, LEFT HIP: ICD-10-CM

## 2025-03-06 DIAGNOSIS — Z96.642 STATUS POST TOTAL REPLACEMENT OF LEFT HIP: Primary | ICD-10-CM

## 2025-03-06 PROCEDURE — 97112 NEUROMUSCULAR REEDUCATION: CPT | Performed by: PHYSICAL THERAPIST

## 2025-03-06 PROCEDURE — 97110 THERAPEUTIC EXERCISES: CPT | Performed by: PHYSICAL THERAPIST

## 2025-03-06 NOTE — PROGRESS NOTES
"Daily Note     Today's date: 3/6/2025  Patient name: Trinh Christopher  : 1950  MRN: [id number]  Referring provider: Kole Peralta*  Dx:   Encounter Diagnosis     ICD-10-CM    1. Status post total replacement of left hip  Z96.642       2. Unilateral primary osteoarthritis, left hip  M16.12           Start Time: 1530  Stop Time: 1621  Total time in clinic (min): 51 minutes    Subjective: Patient reports doing ok and feels she is walking a little bit better.      Objective: See treatment diary below      Assessment: Tolerated treatment well. Unable to perform SLR flexion as anticipated at this point post-op anterior DANICA, initiated TE with a focus on left hip flexor activation and strengthening to translate to transfers and ambulation. Patient demonstrated fatigue post treatment, exhibited good technique with therapeutic exercises, and would benefit from continued PT.      Plan: Continue per plan of care.      Precautions:   Past Medical History:   Diagnosis Date    A-fib (HCC)     since epidural DCed pts been ok    Anxiety     Colon polyp     GERD (gastroesophageal reflux disease)     Hemorrhoids     Hyperlipidemia     Hypertension      ANTERIOR DANICA 25 - trim sutures post op day 10     Daily Treatment Diary     Manuals 2/13 2/21 2/25 2/27 3/4 3/6       PROM L hip - flx, gentle ER/IR, abd nv nv Done  Done  Done - instructed  Done        Suture trimming     Done        TE             Bike - ROM    Rocks  to full 5'  Rocks to full 5' 5' full       Heel slides w strap nv 10x5\" 10x5\" 20x5'' 10x5\" 20x5\"       Gastroc stretch strap nv Trialed no stretch           Hamstring stretch nv 3x20\" 5x20\" 5x20'' 5x20\" 5x20\"                                                                        NMR             Quad sets nv 2x10x5\" 3x10x5\" 3x10x5''         Glute sets  2x10x5\" 3x10x5\" 3x10x5''         SAQ             LAQ   3x10 3x10 2# 3x10 2# 3x12       Supine hip flx isos      2x10x5\" R       SLR " flexion w/ quad set      hard       SLR hip abduction              Standing hip ext     2x10 L 3x10 L       Standing hip abd     2x10 L 3x10 L       Standing march L      nv       Bridging     2x10 3x10       Clamshells     Supine uni GTB 2x10 ea Supine uni GTB 3x10 b/l       Heel raises  nv 2x10 3x10 3x10 3x10       SLS                                       Therapeutic Activity             Pt edu Done Done - POC, prognosis, HEP, icing  Ambulation with RW Done        Leg press              Step ups             Lateral step ups             STS                                                    Modalities             CP PRN

## 2025-03-11 ENCOUNTER — OFFICE VISIT (OUTPATIENT)
Dept: PHYSICAL THERAPY | Facility: REHABILITATION | Age: 75
End: 2025-03-11
Payer: MEDICARE

## 2025-03-11 DIAGNOSIS — Z96.642 STATUS POST TOTAL REPLACEMENT OF LEFT HIP: Primary | ICD-10-CM

## 2025-03-11 DIAGNOSIS — M16.12 UNILATERAL PRIMARY OSTEOARTHRITIS, LEFT HIP: ICD-10-CM

## 2025-03-11 PROCEDURE — 97112 NEUROMUSCULAR REEDUCATION: CPT

## 2025-03-11 PROCEDURE — 97110 THERAPEUTIC EXERCISES: CPT

## 2025-03-11 NOTE — PROGRESS NOTES
"Daily Note     Today's date: 3/11/2025  Patient name: Trinh Christopher  : 1950  MRN: [id number]  Referring provider: Kole Peralta*  Dx:   Encounter Diagnosis     ICD-10-CM    1. Status post total replacement of left hip  Z96.642       2. Unilateral primary osteoarthritis, left hip  M16.12           Start Time: 1045  Stop Time: 1130  Total time in clinic (min): 45 minutes    Subjective: Patient reports her hip is feeling \"pretty good today\" reporting some groin soreness. No complaints noted after previous session.      Objective: See treatment diary below      Assessment: Tolerated treatment well. Patient demonstrated fatigue post treatment, exhibited good technique with therapeutic exercises, and would benefit from continued PT Appropriate challenge to all TE performed, trialed additional TE today with good tolerance no pain noted only muscle fatigue.      Plan: Continue per plan of care.  Progress treatment as tolerated.       Precautions:   Past Medical History:   Diagnosis Date    A-fib (HCC)     since epidural DCed pts been ok    Anxiety     Colon polyp     GERD (gastroesophageal reflux disease)     Hemorrhoids     Hyperlipidemia     Hypertension      ANTERIOR DANICA 25 - trim sutures post op day 10     Daily Treatment Diary     Manuals 2/13 2/21 2/25 2/27 3/4 3/6 3/11      PROM L hip - flx, gentle ER/IR, abd nv nv Done  Done  Done - instructed  Done  Done       Suture trimming     Done        TE             Bike - ROM    Rocks  to full 5'  Rocks to full 5' 5' full 5' lvl 1      Heel slides w strap nv 10x5\" 10x5\" 20x5'' 10x5\" 20x5\" 20x5''      Gastroc stretch strap nv Trialed no stretch           Hamstring stretch nv 3x20\" 5x20\" 5x20'' 5x20\" 5x20\" 5x20''                                                                        NMR             Quad sets nv 2x10x5\" 3x10x5\" 3x10x5''         Glute sets  2x10x5\" 3x10x5\" 3x10x5''         SAQ             LAQ   3x10 3x10 2# 3x10 2# 3x12 2# " "3x12      Supine hip flx isos      2x10x5\" R 2x10x 5''       SLR flexion w/ quad set      hard       SLR hip abduction              Standing hip ext     2x10 L 3x10 L 3x10 L      Standing hip abd     2x10 L 3x10 L 3x10 L      Standing march L      nv 3x10 L      Bridging     2x10 3x10 3x12      Clamshells     Supine uni GTB 2x10 ea Supine uni GTB 3x10 b/l Supine uni GTB 3x10 b/l      Heel raises  nv 2x10 3x10 3x10 3x10 3x10      SLS                                       Therapeutic Activity             Pt edu Done Done - POC, prognosis, HEP, icing  Ambulation with RW Done        Leg press              Step ups       4'' 10x      Lateral step ups             STS                                                    Modalities             CP PRN                           "

## 2025-03-13 ENCOUNTER — OFFICE VISIT (OUTPATIENT)
Dept: PHYSICAL THERAPY | Facility: REHABILITATION | Age: 75
End: 2025-03-13
Payer: MEDICARE

## 2025-03-13 DIAGNOSIS — Z96.642 STATUS POST TOTAL REPLACEMENT OF LEFT HIP: Primary | ICD-10-CM

## 2025-03-13 DIAGNOSIS — M16.12 UNILATERAL PRIMARY OSTEOARTHRITIS, LEFT HIP: ICD-10-CM

## 2025-03-13 PROCEDURE — 97530 THERAPEUTIC ACTIVITIES: CPT

## 2025-03-13 PROCEDURE — 97110 THERAPEUTIC EXERCISES: CPT

## 2025-03-13 PROCEDURE — 97112 NEUROMUSCULAR REEDUCATION: CPT

## 2025-03-13 NOTE — PROGRESS NOTES
"Daily Note     Today's date: 3/13/2025  Patient name: Trinh Christopher  : 1950  MRN: [id number]  Referring provider: Kole Peralta*  Dx:   Encounter Diagnosis     ICD-10-CM    1. Status post total replacement of left hip  Z96.642       2. Unilateral primary osteoarthritis, left hip  M16.12           Start Time: 1130  Stop Time: 1220  Total time in clinic (min): 50 minutes    Subjective: Patient reports her hip is feeling better as of late, occasional groin pain noted. Patient states she is to follow up with MD later today. No complaints noted after previous session.      Objective: See treatment diary below      Assessment: Tolerated treatment well. Patient demonstrated fatigue post treatment, exhibited good technique with therapeutic exercises, and would benefit from continued PT Trialed additional TE today with good tolerance, appropriate challenge noted.       Plan: Continue per plan of care.  Progress treatment as tolerated.       Precautions:   Past Medical History:   Diagnosis Date    A-fib (HCC)     since epidural DCed pts been ok    Anxiety     Colon polyp     GERD (gastroesophageal reflux disease)     Hemorrhoids     Hyperlipidemia     Hypertension      ANTERIOR DANICA 25 - trim sutures post op day 10     Daily Treatment Diary     Manuals 2/13 2/21 2/25 2/27 3/4 3/6 3/11 3/13     PROM L hip - flx, gentle ER/IR, abd nv nv Done  Done  Done - instructed  Done  Done  Done      Suture trimming     Done        TE             Bike - ROM    Rocks  to full 5'  Rocks to full 5' 5' full 5' lvl 1 5' lvl 1     Heel slides w strap nv 10x5\" 10x5\" 20x5'' 10x5\" 20x5\" 20x5'' 20x5''     Gastroc stretch strap nv Trialed no stretch           Hamstring stretch nv 3x20\" 5x20\" 5x20'' 5x20\" 5x20\" 5x20''  5x20''                                                                      NMR             Quad sets nv 2x10x5\" 3x10x5\" 3x10x5''         Glute sets  2x10x5\" 3x10x5\" 3x10x5''         SAQ           " "  LAQ   3x10 3x10 2# 3x10 2# 3x12 2# 3x12 2# 3x15 inc nv     Supine hip flx isos      2x10x5\" R 2x10x 5''  2x10x5''     SLR flexion w/ quad set      hard       SLR hip abduction              Standing hip ext     2x10 L 3x10 L 3x10 L 3x12 L     Standing hip abd     2x10 L 3x10 L 3x10 L 3x12 L      Standing march L      nv 3x10 L 3x12      Bridging     2x10 3x10 3x12 3x15     Clamshells     Supine uni GTB 2x10 ea Supine uni GTB 3x10 b/l Supine uni GTB 3x10 b/l Supine GTB 3x12 b/l     Heel raises  nv 2x10 3x10 3x10 3x10 3x10 3x12     SLS                                       Therapeutic Activity             Pt edu Done Done - POC, prognosis, HEP, icing  Ambulation with RW Done        Leg press              Step ups       4'' 10x 4'' 2x10     Lateral step ups             STS        2x10 foam boost                                            Modalities             CP PRN                             "

## 2025-03-18 ENCOUNTER — OFFICE VISIT (OUTPATIENT)
Dept: PHYSICAL THERAPY | Facility: REHABILITATION | Age: 75
End: 2025-03-18
Payer: MEDICARE

## 2025-03-18 DIAGNOSIS — M16.12 UNILATERAL PRIMARY OSTEOARTHRITIS, LEFT HIP: ICD-10-CM

## 2025-03-18 DIAGNOSIS — Z96.642 STATUS POST TOTAL REPLACEMENT OF LEFT HIP: Primary | ICD-10-CM

## 2025-03-18 PROCEDURE — 97530 THERAPEUTIC ACTIVITIES: CPT | Performed by: PHYSICAL THERAPIST

## 2025-03-18 PROCEDURE — 97112 NEUROMUSCULAR REEDUCATION: CPT | Performed by: PHYSICAL THERAPIST

## 2025-03-18 PROCEDURE — 97110 THERAPEUTIC EXERCISES: CPT | Performed by: PHYSICAL THERAPIST

## 2025-03-18 NOTE — PROGRESS NOTES
"Daily Note     Today's date: 3/18/2025  Patient name: Trinh Christopher  : 1950  MRN: [id number]  Referring provider: Kole Peralta*  Dx:   Encounter Diagnosis     ICD-10-CM    1. Status post total replacement of left hip  Z96.642       2. Unilateral primary osteoarthritis, left hip  M16.12           Start Time: 1045  Stop Time: 1129  Total time in clinic (min): 44 minutes    Subjective: Patient has been walking without the cane around the house.       Objective: See treatment diary below      Assessment: Tolerated treatment well. Progressed in terms of increased resistance, additional hip strengthening and completion of standing hip strengthening bilaterally for LLE single leg tolerance and stability. Patient demonstrated fatigue post treatment, exhibited good technique with therapeutic exercises, and would benefit from continued PT.       Plan: Continue per plan of care.      Precautions:   Past Medical History:   Diagnosis Date    A-fib (HCC)     since epidural DCed pts been ok    Anxiety     Colon polyp     GERD (gastroesophageal reflux disease)     Hemorrhoids     Hyperlipidemia     Hypertension      ANTERIOR DANICA 25 - trim sutures post op day 10     Daily Treatment Diary     Manuals  3/4 3/6 3/11 3/13 3/18    PROM L hip - flx, gentle ER/IR, abd nv nv Done  Done  Done - instructed  Done  Done  Done  Done     Suture trimming     Done        TE             Bike - ROM    [place]  to full 5'  [place] to full 5' 5' full 5' lvl 1 5' lvl 1 5' lv 1    Heel slides w strap nv 10x5\" 10x5\" 20x5'' 10x5\" 20x5\" 20x5'' 20x5''     Gastroc stretch strap nv Trialed no stretch           Hamstring stretch nv 3x20\" 5x20\" 5x20'' 5x20\" 5x20\" 5x20''  5x20'' 5x20\"                                                                     NMR             Quad sets nv 2x10x5\" 3x10x5\" 3x10x5''         Glute sets  2x10x5\" 3x10x5\" 3x10x5''         SAQ             LAQ   3x10 3x10 2# 3x10 2# 3x12 2# " "3x12 2# 3x15 inc nv 5# 3x10    Supine hip flx isos      2x10x5\" R 2x10x 5''  2x10x5'' 20x5\"    SLR flexion w/ quad set      hard       SLR hip abduction              Standing hip ext     2x10 L 3x10 L 3x10 L 3x12 L 3x12 b/l    Standing hip abd     2x10 L 3x10 L 3x10 L 3x12 L  3x12 b/l    Standing march L      nv 3x10 L 3x12  Pink 3x10    TB side stepping         Pink 4 laps table    Bridging     2x10 3x10 3x12 3x15 3x15    Clamshells     Supine uni GTB 2x10 ea Supine uni GTB 3x10 b/l Supine uni GTB 3x10 b/l Supine GTB 3x12 b/l     Heel raises  nv 2x10 3x10 3x10 3x10 3x10 3x12     SLS         nv                              Therapeutic Activity             Pt edu Done Done - POC, prognosis, HEP, icing  Ambulation with RW Done        Leg press              Step ups       4'' 10x 4'' 2x10 4\" 2x10    Lateral step ups             STS        2x10 foam boost Hi lo 3x10                                           Modalities             CP PRN                               "

## 2025-03-20 ENCOUNTER — OFFICE VISIT (OUTPATIENT)
Dept: PHYSICAL THERAPY | Facility: REHABILITATION | Age: 75
End: 2025-03-20
Payer: MEDICARE

## 2025-03-20 DIAGNOSIS — M16.12 UNILATERAL PRIMARY OSTEOARTHRITIS, LEFT HIP: ICD-10-CM

## 2025-03-20 DIAGNOSIS — Z96.642 STATUS POST TOTAL REPLACEMENT OF LEFT HIP: Primary | ICD-10-CM

## 2025-03-20 PROCEDURE — 97110 THERAPEUTIC EXERCISES: CPT

## 2025-03-20 PROCEDURE — 97530 THERAPEUTIC ACTIVITIES: CPT

## 2025-03-20 PROCEDURE — 97112 NEUROMUSCULAR REEDUCATION: CPT

## 2025-03-20 NOTE — PROGRESS NOTES
"Daily Note     Today's date: 3/20/2025  Patient name: Trinh Christopher  : 1950  MRN: [id number]  Referring provider: Kole Peralta*  Dx:   Encounter Diagnosis     ICD-10-CM    1. Status post total replacement of left hip  Z96.642       2. Unilateral primary osteoarthritis, left hip  M16.12           Start Time: 1530  Stop Time: 1615  Total time in clinic (min): 45 minutes    Subjective: Patient reports her hip is \"okay, frustrating sometimes\" reporting soreness/stiffness upon taking first couple steps in the morning or after sitting for a longer period of time.      Objective: See treatment diary below      Assessment: Tolerated treatment well. Patient demonstrated fatigue post treatment, exhibited good technique with therapeutic exercises, and would benefit from continued PT Appropriate challenge noted with all TE. Trialed SLS with challenge noted with no UE support, trialed use of fingertip with patient able to maintain for about 5 seconds, will continue to progress patient as able in future sessions.       Plan: Continue per plan of care.  Progress treatment as tolerated.       Precautions:   Past Medical History:   Diagnosis Date    A-fib (HCC)     since epidural DCed pts been ok    Anxiety     Colon polyp     GERD (gastroesophageal reflux disease)     Hemorrhoids     Hyperlipidemia     Hypertension      ANTERIOR DANICA 25 - trim sutures post op day 10     Daily Treatment Diary     Manuals  3/4 3/6 3/11 3/13 3/18 3/20   PROM L hip - flx, gentle ER/IR, abd nv nv Done  Done  Done - instructed  Done  Done  Done  Done  done   Suture trimming     Done        TE             Bike - ROM    [place]  to full 5'  [place] to full 5' 5' full 5' lvl 1 5' lvl 1 5' lv 1 5' lvl 1   Heel slides w strap nv 10x5\" 10x5\" 20x5'' 10x5\" 20x5\" 20x5'' 20x5''     Gastroc stretch strap nv Trialed no stretch           Hamstring stretch nv 3x20\" 5x20\" 5x20'' 5x20\" 5x20\" 5x20''  5x20'' 5x20\" " "5x20''                                                                    NMR             Quad sets nv 2x10x5\" 3x10x5\" 3x10x5''         Glute sets  2x10x5\" 3x10x5\" 3x10x5''         SAQ             LAQ   3x10 3x10 2# 3x10 2# 3x12 2# 3x12 2# 3x15 inc nv 5# 3x10 5# 3x12   Supine hip flx isos      2x10x5\" R 2x10x 5''  2x10x5'' 20x5\" 20x5''   SLR flexion w/ quad set      hard       SLR hip abduction              Standing hip ext     2x10 L 3x10 L 3x10 L 3x12 L 3x12 b/l 3x15 b/l   Standing hip abd     2x10 L 3x10 L 3x10 L 3x12 L  3x12 b/l 3x15 b/l   Standing march L      nv 3x10 L 3x12  Pink 3x10 Pink 3x10   TB side stepping         Pink 4 laps table Pink 5 laps table    Bridging     2x10 3x10 3x12 3x15 3x15 3x15    Clamshells     Supine uni GTB 2x10 ea Supine uni GTB 3x10 b/l Supine uni GTB 3x10 b/l Supine GTB 3x12 b/l     Heel raises  nv 2x10 3x10 3x10 3x10 3x10 3x12     SLS         nv Hard- use of fingertip 5''x10                             Therapeutic Activity             Pt edu Done Done - POC, prognosis, HEP, icing  Ambulation with RW Done        Leg press              Step ups       4'' 10x 4'' 2x10 4\" 2x10 4'' 3x10   Lateral step ups             STS        2x10 foam boost Hi lo 3x10 Hi low 3x10                                          Modalities             CP PRN                                 "

## 2025-03-25 ENCOUNTER — OFFICE VISIT (OUTPATIENT)
Dept: PHYSICAL THERAPY | Facility: REHABILITATION | Age: 75
End: 2025-03-25
Payer: MEDICARE

## 2025-03-25 DIAGNOSIS — Z96.642 STATUS POST TOTAL REPLACEMENT OF LEFT HIP: Primary | ICD-10-CM

## 2025-03-25 DIAGNOSIS — M16.12 UNILATERAL PRIMARY OSTEOARTHRITIS, LEFT HIP: ICD-10-CM

## 2025-03-25 PROCEDURE — 97530 THERAPEUTIC ACTIVITIES: CPT

## 2025-03-25 PROCEDURE — 97110 THERAPEUTIC EXERCISES: CPT

## 2025-03-25 NOTE — PROGRESS NOTES
"Daily Note     Today's date: 3/25/2025  Patient name: Trinh Christopher  : 1950  MRN: [id number]  Referring provider: Kole Peralta*  Dx:   Encounter Diagnosis     ICD-10-CM    1. Status post total replacement of left hip  Z96.642       2. Unilateral primary osteoarthritis, left hip  M16.12           Start Time: 1045  Stop Time: 1115  Total time in clinic (min): 30 minutes    Subjective: Patient reports overall her hip is feeling better, some groin soreness still noted. Patient requesting to leave session early today.       Objective: See treatment diary below      Assessment: Tolerated treatment well. Patient demonstrated fatigue post treatment, exhibited good technique with therapeutic exercises, and would benefit from continued PT Trialed SLR flexion again today with improvements noted since last attempt, able to maintain quad contraction with patient able to initiate lift, limited ROM. Did not complete certain TE secondary to patient requesting to leave session early. Will continue to progress patient as able next visit.       Plan: Continue per plan of care.  Progress treatment as tolerated.       Precautions:   Past Medical History:   Diagnosis Date    A-fib (HCC)     since epidural DCed pts been ok    Anxiety     Colon polyp     GERD (gastroesophageal reflux disease)     Hemorrhoids     Hyperlipidemia     Hypertension      ANTERIOR DANICA 25 - trim sutures post op day 10     Daily Treatment Diary     Manuals 3/25  2/25 2/27 3/4 3/6 3/11 3/13 3/18 3/20   PROM L hip - flx, gentle ER/IR, abd done  Done  Done  Done - instructed  Done  Done  Done  Done  done   Suture trimming     Done        TE             Bike - ROM 5' lvl 1   [place]ocks  to full 5'  [place] to full 5' 5' full 5' lvl 1 5' lvl 1 5' lv 1 5' lvl 1   Heel slides w strap   10x5\" 20x5'' 10x5\" 20x5\" 20x5'' 20x5''     Gastroc stretch strap             Hamstring stretch   5x20\" 5x20'' 5x20\" 5x20\" 5x20''  5x20'' 5x20\" 5x20''        " "                                                            NMR             Quad sets   3x10x5\" 3x10x5''         Glute sets   3x10x5\" 3x10x5''         SAQ             LAQ   3x10 3x10 2# 3x10 2# 3x12 2# 3x12 2# 3x15 inc nv 5# 3x10 5# 3x12   Supine hip flx isos 20x5''     2x10x5\" R 2x10x 5''  2x10x5'' 20x5\" 20x5''   SLR flexion w/ quad set Attempted, improved      hard       SLR hip abduction              Standing hip ext nv    2x10 L 3x10 L 3x10 L 3x12 L 3x12 b/l 3x15 b/l   Standing hip abd nv    2x10 L 3x10 L 3x10 L 3x12 L  3x12 b/l 3x15 b/l   Standing march L nv     nv 3x10 L 3x12  Pink 3x10 Pink 3x10   TB side stepping nv        Pink 4 laps table Pink 5 laps table    Bridging 3x15    2x10 3x10 3x12 3x15 3x15 3x15    Clamshells     Supine uni GTB 2x10 ea Supine uni GTB 3x10 b/l Supine uni GTB 3x10 b/l Supine GTB 3x12 b/l     Heel raises   2x10 3x10 3x10 3x10 3x10 3x12     SLS nv        nv Hard- use of fingertip 5''x10                             Therapeutic Activity             Pt edu    Ambulation with RW Done        Leg press              Step ups 4'' 3x10      4'' 10x 4'' 2x10 4\" 2x10 4'' 3x10   Lateral step ups             STS Hi low 3x10       2x10 foam boost Hi lo 3x10 Hi low 3x10                                          Modalities             CP PRN                                   "

## 2025-03-27 ENCOUNTER — OFFICE VISIT (OUTPATIENT)
Dept: PHYSICAL THERAPY | Facility: REHABILITATION | Age: 75
End: 2025-03-27
Payer: MEDICARE

## 2025-03-27 DIAGNOSIS — M16.12 UNILATERAL PRIMARY OSTEOARTHRITIS, LEFT HIP: ICD-10-CM

## 2025-03-27 DIAGNOSIS — Z96.642 STATUS POST TOTAL REPLACEMENT OF LEFT HIP: Primary | ICD-10-CM

## 2025-03-27 PROCEDURE — 97112 NEUROMUSCULAR REEDUCATION: CPT

## 2025-03-27 PROCEDURE — 97530 THERAPEUTIC ACTIVITIES: CPT

## 2025-03-27 NOTE — PROGRESS NOTES
"Daily Note     Today's date: 3/27/2025  Patient name: Trinh Christopher  : 1950  MRN: [id number]  Referring provider: Kole Peralta*  Dx:   Encounter Diagnosis     ICD-10-CM    1. Status post total replacement of left hip  Z96.642       2. Unilateral primary osteoarthritis, left hip  M16.12           Start Time: 1500  Stop Time: 1545  Total time in clinic (min): 45 minutes    Subjective: Patient reports her hip has been feeling pretty good the last two days, reporting she has not had the groin pain that she was having. She reports she went back to work yesterday for a few hours, with no issues, fatigue noted but overall okay.       Objective: See treatment diary below      Assessment: Tolerated treatment well. Patient demonstrated fatigue post treatment, exhibited good technique with therapeutic exercises, and would benefit from continued PT Trialed SLR flexion today with good tolerance, no lag or discomfort noted, only fatigue. Good tolerance to addition of TB with standing TE.       Plan: Continue per plan of care.  Progress treatment as tolerated.       Precautions:   Past Medical History:   Diagnosis Date    A-fib (HCC)     since epidural DCed pts been ok    Anxiety     Colon polyp     GERD (gastroesophageal reflux disease)     Hemorrhoids     Hyperlipidemia     Hypertension      ANTERIOR DANICA 25 - trim sutures post op day 10     Daily Treatment Diary     Manuals 3/25 3/27  2/27 3/4 3/6 3/11 3/13 3/18 3/20   PROM L hip - flx, gentle ER/IR, abd done done  Done  Done - instructed  Done  Done  Done  Done  done   Suture trimming     Done        TE             Bike - ROM 5' lvl 1 5' lvl 1  Rocks  to full 5'  [place]ocks to full 5' 5' full 5' lvl 1 5' lvl 1 5' lv 1 5' lvl 1   Heel slides w strap    20x5'' 10x5\" 20x5\" 20x5'' 20x5''     Gastroc stretch strap             Hamstring stretch    5x20'' 5x20\" 5x20\" 5x20''  5x20'' 5x20\" 5x20''                                                          " "          NMR             Quad sets    3x10x5''         Glute sets    3x10x5''         SAQ             LAQ    3x10 2# 3x10 2# 3x12 2# 3x12 2# 3x15 inc nv 5# 3x10 5# 3x12   Supine hip flx isos 20x5'' 20x5''    2x10x5\" R 2x10x 5''  2x10x5'' 20x5\" 20x5''   SLR flexion w/ quad set Attempted, improved  2x6     hard       SLR hip abduction              Standing hip ext nv [place] TB 3x10 b/l   2x10 L 3x10 L 3x10 L 3x12 L 3x12 b/l 3x15 b/l   Standing hip abd nv [place] TB 3x10 b/l   2x10 L 3x10 L 3x10 L 3x12 L  3x12 b/l 3x15 b/l   Standing march L nv Pink 3x10    nv 3x10 L 3x12  Pink 3x10 Pink 3x10   TB side stepping nv Pink 5 laps table       Pink 4 laps table Pink 5 laps table    Bridging 3x15 10# 3x12   2x10 3x10 3x12 3x15 3x15 3x15    Clamshells     Supine uni GTB 2x10 ea Supine uni GTB 3x10 b/l Supine uni GTB 3x10 b/l Supine GTB 3x12 b/l     Heel raises    3x10 3x10 3x10 3x10 3x12     SLS nv Use of fingertips 5''x10       nv Hard- use of fingertip 5''x10                             Therapeutic Activity             Pt edu    Ambulation with RW Done        Leg press              Step ups 4'' 3x10 4'' 3x12     4'' 10x 4'' 2x10 4\" 2x10 4'' 3x10   Lateral step ups             STS Hi low 3x10 Hi low-height of chair 3x10      2x10 foam boost Hi lo 3x10 Hi low 3x10                                          Modalities             CP PRN                                     "

## 2025-04-01 ENCOUNTER — OFFICE VISIT (OUTPATIENT)
Dept: PHYSICAL THERAPY | Facility: REHABILITATION | Age: 75
End: 2025-04-01
Payer: MEDICARE

## 2025-04-01 DIAGNOSIS — M16.12 UNILATERAL PRIMARY OSTEOARTHRITIS, LEFT HIP: ICD-10-CM

## 2025-04-01 DIAGNOSIS — Z96.642 STATUS POST TOTAL REPLACEMENT OF LEFT HIP: Primary | ICD-10-CM

## 2025-04-01 PROCEDURE — 97112 NEUROMUSCULAR REEDUCATION: CPT | Performed by: PHYSICAL THERAPIST

## 2025-04-01 PROCEDURE — 97530 THERAPEUTIC ACTIVITIES: CPT | Performed by: PHYSICAL THERAPIST

## 2025-04-01 NOTE — PROGRESS NOTES
"Daily Note     Today's date: 2025  Patient name: Trinh Christopher  : 1950  MRN: [id number]  Referring provider: Kole Peralta*  Dx:   Encounter Diagnosis     ICD-10-CM    1. Status post total replacement of left hip  Z96.642       2. Unilateral primary osteoarthritis, left hip  M16.12           Start Time: 1717  Stop Time: 1801  Total time in clinic (min): 44 minutes    Subjective: Patient reports fatigue after returning to work this week.      Objective: See treatment diary below      Assessment: Tolerated treatment well. Progressed in terms of increased repetitions and additional closed chain lateral hip strengthening with good tolerance. SLS balance <3\" without UE support. Patient demonstrated fatigue post treatment, exhibited good technique with therapeutic exercises, and would benefit from continued PT.      Plan: Continue per plan of care.      Precautions:   Past Medical History:   Diagnosis Date    A-fib (HCC)     since epidural DCed pts been ok    Anxiety     Colon polyp     GERD (gastroesophageal reflux disease)     Hemorrhoids     Hyperlipidemia     Hypertension      ANTERIOR DANICA 25 - trim sutures post op day 10     Daily Treatment Diary     Manuals 3/25 3/27 4/1   3/6 3/11 3/13 3/18 3/20   PROM L hip - flx, gentle ER/IR, abd done done Done    Done  Done  Done  Done  done   Suture trimming             TE             Bike - ROM 5' lvl 1 5' lvl 1 5' lv 1   5' full 5' lvl 1 5' lvl 1 5' lv 1 5' lvl 1   Heel slides w strap      20x5\" 20x5'' 20x5''     Gastroc stretch strap             Hamstring stretch      5x20\" 5x20''  5x20'' 5x20\" 5x20''                             NMR             LAQ   Matrix nv   2# 3x12 2# 3x12 2# 3x15 inc nv 5# 3x10 5# 3x12   Supine hip flx isos 20x5'' 20x5'' dc   2x10x5\" R 2x10x 5''  2x10x5'' 20x5\" 20x5''   SLR flexion w/ quad set Attempted, improved  2x6  3x5   hard       SLR hip abduction              Standing hip ext nv [place] TB 3x10 b/l Pink 3x12 b/l " "  3x10 L 3x10 L 3x12 L 3x12 b/l 3x15 b/l   Standing hip abd nv [place] TB 3x10 b/l Pink TB 3x12 b/l   3x10 L 3x10 L 3x12 L  3x12 b/l 3x15 b/l   Standing march L nv Pink 3x10 Pink 3x12 L ONLY   nv 3x10 L 3x12  Pink 3x10 Pink 3x10   TB side stepping nv Pink 5 laps table Pink 5 laps table      Pink 4 laps table Pink 5 laps table    Bridging 3x15 10# 3x12 10# 3x12   3x10 3x12 3x15 3x15 3x15    Clamshells      Supine uni GTB 3x10 b/l Supine uni GTB 3x10 b/l Supine GTB 3x12 b/l     Heel raises      3x10 3x10 3x12     SLS nv Use of fingertips 5''x10 10x no fingers to tolerance      nv Hard- use of fingertip 5''x10                             Therapeutic Activity             Pt edu             Leg press              Mini squats   nv          Step ups 4'' 3x10 4'' 3x12 4\" 3x12    4'' 10x 4'' 2x10 4\" 2x10 4'' 3x10   Lateral step ups   4\" 3x10          STS Hi low 3x10 Hi low-height of chair 3x10 Hi lo 3x12     2x10 foam boost Hi lo 3x10 Hi low 3x10                                          Modalities             CP PRN                                       "

## 2025-04-03 ENCOUNTER — OFFICE VISIT (OUTPATIENT)
Dept: PHYSICAL THERAPY | Facility: REHABILITATION | Age: 75
End: 2025-04-03
Payer: MEDICARE

## 2025-04-03 DIAGNOSIS — M16.12 UNILATERAL PRIMARY OSTEOARTHRITIS, LEFT HIP: ICD-10-CM

## 2025-04-03 DIAGNOSIS — Z96.642 STATUS POST TOTAL REPLACEMENT OF LEFT HIP: Primary | ICD-10-CM

## 2025-04-03 PROCEDURE — 97530 THERAPEUTIC ACTIVITIES: CPT | Performed by: PHYSICAL THERAPIST

## 2025-04-03 PROCEDURE — 97112 NEUROMUSCULAR REEDUCATION: CPT | Performed by: PHYSICAL THERAPIST

## 2025-04-03 NOTE — PROGRESS NOTES
"Daily Note     Today's date: 4/3/2025  Patient name: Trinh Christopher  : 1950  MRN: [id number]  Referring provider: Kole Peralta*  Dx:   Encounter Diagnosis     ICD-10-CM    1. Status post total replacement of left hip  Z96.642       2. Unilateral primary osteoarthritis, left hip  M16.12           Start Time: 1615  Stop Time: 1700  Total time in clinic (min): 45 minutes    Subjective: Patient      Objective: See treatment diary below      Assessment: Tolerated treatment well. Progressed in terms of increased challenge and additional closed chain strengthening with good tolerance. Increased fatigue noted throughout today's session. Patient demonstrated fatigue post treatment, exhibited good technique with therapeutic exercises, and would benefit from continued PT.       Plan: Continue per plan of care.      Precautions:   Past Medical History:   Diagnosis Date    A-fib (HCC)     since epidural DCed pts been ok    Anxiety     Colon polyp     GERD (gastroesophageal reflux disease)     Hemorrhoids     Hyperlipidemia     Hypertension      ANTERIOR DANICA 25 - trim sutures post op day 10     Daily Treatment Diary     Manuals 3/25 3/27 4/1 4/3  3/6 3/11 3/13 3/18 3/20   PROM L hip - flx, gentle ER/IR, abd done done Done  Done  Done  Done  Done  Done  done   Suture trimming             TE             Bike - ROM 5' lvl 1 5' lvl 1 5' lv 1 5' lv 1  5' full 5' lvl 1 5' lvl 1 5' lv 1 5' lvl 1   Heel slides w strap      20x5\" 20x5'' 20x5''     Gastroc stretch strap             Hamstring stretch      5x20\" 5x20''  5x20'' 5x20\" 5x20''                             NMR             LAQ   Matrix nv Matrix LLE 15# 3x8  2# 3x12 2# 3x12 2# 3x15 inc nv 5# 3x10 5# 3x12   Supine hip flx isos 20x5'' 20x5'' dc   2x10x5\" R 2x10x 5''  2x10x5'' 20x5\" 20x5''   SLR flexion w/ quad set Attempted, improved  2x6  3x5 2x8  hard       SLR hip abduction              Standing hip ext nv [place] TB 3x10 b/l Pink 3x12 b/l Pink 3x12 " "b/l  3x10 L 3x10 L 3x12 L 3x12 b/l 3x15 b/l   Standing hip abd nv [place] TB 3x10 b/l Pink TB 3x12 b/l Pink 3x12 b/l  3x10 L 3x10 L 3x12 L  3x12 b/l 3x15 b/l   Standing march L nv Pink 3x10 Pink 3x12 L ONLY   nv 3x10 L 3x12  Pink 3x10 Pink 3x10   TB side stepping nv Pink 5 laps table Pink 5 laps table Pink 5 laps table     Pink 4 laps table Pink 5 laps table    Bridging 3x15 10# 3x12 10# 3x12 10# 3x12  3x10 3x12 3x15 3x15 3x15    Clamshells      Supine uni GTB 3x10 b/l Supine uni GTB 3x10 b/l Supine GTB 3x12 b/l     Heel raises      3x10 3x10 3x12     SLS nv Use of fingertips 5''x10 10x no fingers to tolerance nv     nv Hard- use of fingertip 5''x10                             Therapeutic Activity             Pt edu             Leg press              Mini squats   nv 3x10         Step ups 4'' 3x10 4'' 3x12 4\" 3x12 6\" 3x10   4'' 10x 4'' 2x10 4\" 2x10 4'' 3x10   Lateral step ups   4\" 3x10 6\" 3x10         STS Hi low 3x10 Hi low-height of chair 3x10 Hi lo 3x12 Hi lo 3x12    2x10 foam boost Hi lo 3x10 Hi low 3x10                                          Modalities             CP PRN                                         "

## 2025-04-08 ENCOUNTER — OFFICE VISIT (OUTPATIENT)
Dept: PHYSICAL THERAPY | Facility: REHABILITATION | Age: 75
End: 2025-04-08
Payer: MEDICARE

## 2025-04-08 DIAGNOSIS — Z96.642 STATUS POST TOTAL REPLACEMENT OF LEFT HIP: Primary | ICD-10-CM

## 2025-04-08 DIAGNOSIS — M16.12 UNILATERAL PRIMARY OSTEOARTHRITIS, LEFT HIP: ICD-10-CM

## 2025-04-08 PROCEDURE — 97112 NEUROMUSCULAR REEDUCATION: CPT | Performed by: PHYSICAL THERAPIST

## 2025-04-08 PROCEDURE — 97530 THERAPEUTIC ACTIVITIES: CPT | Performed by: PHYSICAL THERAPIST

## 2025-04-08 NOTE — PROGRESS NOTES
"Daily Note     Today's date: 2025  Patient name: Trinh Christopher  : 1950  MRN: [id number]  Referring provider: Kole Peralta*  Dx:   Encounter Diagnosis     ICD-10-CM    1. Status post total replacement of left hip  Z96.642       2. Unilateral primary osteoarthritis, left hip  M16.12           Start Time: 1617  Stop Time: 1702  Total time in clinic (min): 45 minutes    Subjective: No new complaints.      Objective: See treatment diary below      Assessment: Tolerated treatment well. Form failure with lateral step ups secondary to fatigue therefore held remaining repetitions. Overall fatigue noted throughout session indicating proper dosage of TE. Patient demonstrated fatigue post treatment, exhibited good technique with therapeutic exercises, and would benefit from continued PT.      Plan: Continue per plan of care.      Precautions:   Past Medical History:   Diagnosis Date    A-fib (HCC)     since epidural DCed pts been ok    Anxiety     Colon polyp     GERD (gastroesophageal reflux disease)     Hemorrhoids     Hyperlipidemia     Hypertension      ANTERIOR DANICA 25 - trim sutures post op day 10     Daily Treatment Diary     Manuals 3/25 3/27 4/1 4/3 4/8   3/13 3/18 3/20   PROM L hip - flx, gentle ER/IR, abd done done Done  Done Done    Done  Done  done   Suture trimming             TE             Bike - ROM 5' lvl 1 5' lvl 1 5' lv 1 5' lv 1 5'   5' lvl 1 5' lv 1 5' lvl 1   Heel slides w strap        20x5''     Gastroc stretch strap             Hamstring stretch        5x20'' 5x20\" 5x20''                             NMR             LAQ   Matrix nv Matrix LLE 15# 3x8 Matrix LLE 15# 3x8   2# 3x15 inc nv 5# 3x10 5# 3x12   Supine hip flx isos 20x5'' 20x5'' dc     2x10x5'' 20x5\" 20x5''   SLR flexion w/ quad set Attempted, improved  2x6  3x5 2x8 2x10        SLR hip abduction              Standing hip ext nv [place] TB 3x10 b/l Pink 3x12 b/l Pink 3x12 b/l Pink 3x10 b/l   3x12 L 3x12 b/l 3x15 " "b/l   Standing hip abd nv [place] TB 3x10 b/l Pink TB 3x12 b/l Pink 3x12 b/l Pink 3x10 b/l    3x12 L  3x12 b/l 3x15 b/l   Standing march L nv Pink 3x10 Pink 3x12 L ONLY     3x12  Pink 3x10 Pink 3x10   TB side stepping nv Pink 5 laps table Pink 5 laps table Pink 5 laps table Can do nv    Pink 4 laps table Pink 5 laps table    Bridging 3x15 10# 3x12 10# 3x12 10# 3x12 12# 3x12   3x15 3x15 3x15    Clamshells        Supine GTB 3x12 b/l     Heel raises        3x12     SLS nv Use of fingertips 5''x10 10x no fingers to tolerance nv 5x20\" fingertips    nv Hard- use of fingertip 5''x10                             Therapeutic Activity             Pt edu             Leg press              Mini squats   nv 3x10 3x10        Step ups 4'' 3x10 4'' 3x12 4\" 3x12 6\" 3x10 6\" 3x10   4'' 2x10 4\" 2x10 4'' 3x10   Lateral step ups   4\" 3x10 6\" 3x10 6\" 2x10  1x5 (fatigue)        STS Hi low 3x10 Hi low-height of chair 3x10 Hi lo 3x12 Hi lo 3x12 Hi lo 3x12   2x10 foam boost Hi lo 3x10 Hi low 3x10                                          Modalities             CP PRN                                           "

## 2025-04-10 ENCOUNTER — OFFICE VISIT (OUTPATIENT)
Dept: PHYSICAL THERAPY | Facility: REHABILITATION | Age: 75
End: 2025-04-10
Payer: MEDICARE

## 2025-04-10 DIAGNOSIS — Z96.642 STATUS POST TOTAL REPLACEMENT OF LEFT HIP: Primary | ICD-10-CM

## 2025-04-10 DIAGNOSIS — M16.12 UNILATERAL PRIMARY OSTEOARTHRITIS, LEFT HIP: ICD-10-CM

## 2025-04-10 PROCEDURE — 97110 THERAPEUTIC EXERCISES: CPT

## 2025-04-10 PROCEDURE — 97112 NEUROMUSCULAR REEDUCATION: CPT

## 2025-04-10 NOTE — PROGRESS NOTES
"Daily Note     Today's date: 4/10/2025  Patient name: Trinh Christopher  : 1950  MRN: [id number]  Referring provider: Kole Peralta*  Dx:   Encounter Diagnosis     ICD-10-CM    1. Status post total replacement of left hip  Z96.642       2. Unilateral primary osteoarthritis, left hip  M16.12                      Subjective: Patient reports increased fatigue and soreness today from sitting and standing a lot throughout her work day      Objective: See treatment diary below      Assessment: Tolerated treatment well. Patient demonstrated fatigue post treatment      Plan: Continue per plan of care.      Precautions:   Past Medical History:   Diagnosis Date    A-fib (HCC)     since epidural DCed pts been ok    Anxiety     Colon polyp     GERD (gastroesophageal reflux disease)     Hemorrhoids     Hyperlipidemia     Hypertension      ANTERIOR DANICA 25 - trim sutures post op day 10     Daily Treatment Diary     Manuals 3/25 3/27 4/1 4/3 4/8 4/10  3/13 3/18 3/20   PROM L hip - flx, gentle ER/IR, abd done done Done  Done Done  STEPHENS  Done  Done  done   Suture trimming             TE             Bike - ROM 5' lvl 1 5' lvl 1 5' lv 1 5' lv 1 5' 5'  5' lvl 1 5' lv 1 5' lvl 1   Heel slides w strap        20x5''     Gastroc stretch strap             Hamstring stretch        5x20'' 5x20\" 5x20''                             NMR             LAQ   Matrix nv Matrix LLE 15# 3x8 Matrix LLE 15# 3x8 Matrix LLE 15# 3x10  2# 3x15 inc nv 5# 3x10 5# 3x12   Supine hip flx isos 20x5'' 20x5'' dc     2x10x5'' 20x5\" 20x5''   SLR flexion w/ quad set Attempted, improved  2x6  3x5 2x8 2x10 2x10       SLR hip abduction              Standing hip ext nv Pink TB 3x10 b/l Pink 3x12 b/l Pink 3x12 b/l Pink 3x10 b/l Pink 3x10 b/l  3x12 L 3x12 b/l 3x15 b/l   Standing hip abd nv [place] TB 3x10 b/l Pink TB 3x12 b/l Pink 3x12 b/l Pink 3x10 b/l  Pink 3x10 b/l  3x12 L  3x12 b/l 3x15 b/l   Standing march L nv Pink 3x10 Pink 3x12 L ONLY     3x12  " "Pink 3x10 Pink 3x10   TB side stepping nv Pink 5 laps table Pink 5 laps table Pink 5 laps table Can do nv    Pink 4 laps table Pink 5 laps table    Bridging 3x15 10# 3x12 10# 3x12 10# 3x12 12# 3x12   3x15 3x15 3x15    Clamshells        Supine GTB 3x12 b/l     Heel raises        3x12     SLS nv Use of fingertips 5''x10 10x no fingers to tolerance nv 5x20\" fingertips 5x20\" fingertips   nv Hard- use of fingertip 5''x10                             Therapeutic Activity             Pt edu             Leg press              Mini squats   nv 3x10 3x10 3x10       Step ups 4'' 3x10 4'' 3x12 4\" 3x12 6\" 3x10 6\" 3x10 6\" 3x10  4'' 2x10 4\" 2x10 4'' 3x10   Lateral step ups   4\" 3x10 6\" 3x10 6\" 2x10  1x5 (fatigue) 6\" 2x10       STS Hi low 3x10 Hi low-height of chair 3x10 Hi lo 3x12 Hi lo 3x12 Hi lo 3x12 2x10  2x10 foam boost Hi lo 3x10 Hi low 3x10                                          Modalities             CP PRN                                             "

## 2025-04-14 ENCOUNTER — OFFICE VISIT (OUTPATIENT)
Dept: PHYSICAL THERAPY | Facility: REHABILITATION | Age: 75
End: 2025-04-14
Attending: FAMILY MEDICINE
Payer: MEDICARE

## 2025-04-14 DIAGNOSIS — M16.12 UNILATERAL PRIMARY OSTEOARTHRITIS, LEFT HIP: ICD-10-CM

## 2025-04-14 DIAGNOSIS — Z96.642 STATUS POST TOTAL REPLACEMENT OF LEFT HIP: Primary | ICD-10-CM

## 2025-04-14 PROCEDURE — 97140 MANUAL THERAPY 1/> REGIONS: CPT

## 2025-04-14 PROCEDURE — 97110 THERAPEUTIC EXERCISES: CPT

## 2025-04-14 PROCEDURE — 97112 NEUROMUSCULAR REEDUCATION: CPT

## 2025-04-16 ENCOUNTER — OFFICE VISIT (OUTPATIENT)
Dept: PHYSICAL THERAPY | Facility: REHABILITATION | Age: 75
End: 2025-04-16
Attending: FAMILY MEDICINE
Payer: MEDICARE

## 2025-04-16 DIAGNOSIS — Z96.642 STATUS POST TOTAL REPLACEMENT OF LEFT HIP: Primary | ICD-10-CM

## 2025-04-16 DIAGNOSIS — M16.12 UNILATERAL PRIMARY OSTEOARTHRITIS, LEFT HIP: ICD-10-CM

## 2025-04-16 PROCEDURE — 97140 MANUAL THERAPY 1/> REGIONS: CPT | Performed by: PHYSICAL THERAPIST

## 2025-04-16 NOTE — PROGRESS NOTES
Daily Note     Today's date: 2025  Patient name: Trinh Christopher  : 1950  MRN: [id number]  Referring provider: Kole Peralta*  Dx:   Encounter Diagnosis     ICD-10-CM    1. Status post total replacement of left hip  Z96.642       2. Unilateral primary osteoarthritis, left hip  M16.12           Start Time: 1634  Stop Time: 1650  Total time in clinic (min): 16 minutes    Subjective: No new complaints.       Objective: See treatment diary below      Assessment: Tolerated treatment well. Patient requests to leave session early secondary to scheduling conflicts. Re-initiate TE next visit. Patient would benefit from continued PT.      Plan: Continue per plan of care.      Precautions:   Past Medical History:   Diagnosis Date    A-fib (HCC)     since epidural DCed pts been ok    Anxiety     Colon polyp     GERD (gastroesophageal reflux disease)     Hemorrhoids     Hyperlipidemia     Hypertension      ANTERIOR DANICA 25 - trim sutures post op day 10     Daily Treatment Diary     Manuals 3/25 3/27 4/1 4/3 4/8 4/10 4/14 4/16  3/20   PROM L hip - flx, gentle ER/IR, abd done done Done  Done Done  STEPHENS KSG Done   done   Suture trimming             TE             Bike - ROM 5' lvl 1 5' lvl 1 5' lv 1 5' lv 1 5' 5' 5' 5'  5' lvl 1   Heel slides w strap             Gastroc stretch strap             Hamstring stretch          5x20''                             NMR             LAQ   Matrix nv Matrix LLE 15# 3x8 Matrix LLE 15# 3x8 Matrix LLE 15# 3x10 Matrix LLE 15# 3x10   5# 3x12   Supine hip flx isos 20x5'' 20x5'' dc       20x5''   SLR flexion w/ quad set Attempted, improved  2x6  3x5 2x8 2x10 2x10 2x10      SLR hip abduction              Standing hip ext nv Pink TB 3x10 b/l Pink 3x12 b/l Pink 3x12 b/l Pink 3x10 b/l Pink 3x10 b/l Pink 3x10 b/l   3x15 b/l   Standing hip abd nv [place] TB 3x10 b/l Pink TB 3x12 b/l Pink 3x12 b/l Pink 3x10 b/l  Pink 3x10 b/l Pink 3x10 b/l   3x15 b/l   Standing march L nv Pink  "3x10 Pink 3x12 L ONLY       Pink 3x10   TB side stepping nv Pink 5 laps table Pink 5 laps table Pink 5 laps table Can do nv     Pink 5 laps table    Bridging 3x15 10# 3x12 10# 3x12 10# 3x12 12# 3x12     3x15    Clamshells             Heel raises             SLS nv Use of fingertips 5''x10 10x no fingers to tolerance nv 5x20\" fingertips 5x20\" fingertips 5x20\" fingertips   Hard- use of fingertip 5''x10                             Therapeutic Activity             Pt edu             Leg press              Mini squats   nv 3x10 3x10 3x10 3x10      Step ups 4'' 3x10 4'' 3x12 4\" 3x12 6\" 3x10 6\" 3x10 6\" 3x10 6\" 3x10   4'' 3x10   Lateral step ups   4\" 3x10 6\" 3x10 6\" 2x10  1x5 (fatigue) 6\" 2x10 6\" 2x10      STS Hi low 3x10 Hi low-height of chair 3x10 Hi lo 3x12 Hi lo 3x12 Hi lo 3x12 2x10 2x10   Hi low 3x10                                          Modalities             CP PRN                                               "

## 2025-04-23 ENCOUNTER — OFFICE VISIT (OUTPATIENT)
Dept: PHYSICAL THERAPY | Facility: REHABILITATION | Age: 75
End: 2025-04-23
Payer: MEDICARE

## 2025-04-23 DIAGNOSIS — M16.12 UNILATERAL PRIMARY OSTEOARTHRITIS, LEFT HIP: ICD-10-CM

## 2025-04-23 DIAGNOSIS — Z96.642 STATUS POST TOTAL REPLACEMENT OF LEFT HIP: Primary | ICD-10-CM

## 2025-04-23 PROCEDURE — 97140 MANUAL THERAPY 1/> REGIONS: CPT

## 2025-04-23 PROCEDURE — 97112 NEUROMUSCULAR REEDUCATION: CPT

## 2025-04-23 PROCEDURE — 97530 THERAPEUTIC ACTIVITIES: CPT

## 2025-04-23 NOTE — PROGRESS NOTES
Daily Note     Today's date: 2025  Patient name: Trinh Christopher  : 1950  MRN: [id number]  Referring provider: Kole Peralta*  Dx:   Encounter Diagnosis     ICD-10-CM    1. Status post total replacement of left hip  Z96.642       2. Unilateral primary osteoarthritis, left hip  M16.12           Start Time: 1610  Stop Time: 1655  Total time in clinic (min): 45 minutes    Subjective: Patient reports her hip has been good as of late, no complaints noted. Patient is to follow up with MD May 15th.       Objective: See treatment diary below      Assessment: Tolerated treatment well. Patient demonstrated fatigue post treatment, exhibited good technique with therapeutic exercises, and would benefit from continued PT Good tolerance to all TE performed, no hip pain noted throughout session, only reports of muscle fatigue.       Plan: Continue per plan of care.  Progress treatment as tolerated.       Precautions:   Past Medical History:   Diagnosis Date    A-fib (HCC)     since epidural DCed pts been ok    Anxiety     Colon polyp     GERD (gastroesophageal reflux disease)     Hemorrhoids     Hyperlipidemia     Hypertension      ANTERIOR DANICA 25 - trim sutures post op day 10     Daily Treatment Diary     Manuals 3/25 3/27 4/1 4/3 4/8 4/10 4/14 4/16 4/23    PROM L hip - flx, gentle ER/IR, abd done done Done  Done Done  STEPHENS KSG Done  Done     Suture trimming             TE             Bike - ROM 5' lvl 1 5' lvl 1 5' lv 1 5' lv 1 5' 5' 5' 5' 5' lvl 1    Heel slides w strap             Gastroc stretch strap             Hamstring stretch                                       NMR             LAQ   Matrix nv Matrix LLE 15# 3x8 Matrix LLE 15# 3x8 Matrix LLE 15# 3x10 Matrix LLE 15# 3x10  Matrix LLE 15# 3x10    Supine hip flx isos 20x5'' 20x5'' dc          SLR flexion w/ quad set Attempted, improved  2x6  3x5 2x8 2x10 2x10 2x10  2x10    SLR hip abduction              Standing hip ext nv [place] TB 3x10 b/l  "Pink 3x12 b/l Pink 3x12 b/l Pink 3x10 b/l Pink 3x10 b/l Pink 3x10 b/l  Pink 3x10 b/l    Standing hip abd nv [place] TB 3x10 b/l Pink TB 3x12 b/l Pink 3x12 b/l Pink 3x10 b/l  Pink 3x10 b/l Pink 3x10 b/l  Pink 3x10 b/l    Standing march L nv Pink 3x10 Pink 3x12 L ONLY          TB side stepping nv Pink 5 laps table Pink 5 laps table Pink 5 laps table Can do nv        Bridging 3x15 10# 3x12 10# 3x12 10# 3x12 12# 3x12    12# 3x12    Clamshells             Heel raises             SLS nv Use of fingertips 5''x10 10x no fingers to tolerance nv 5x20\" fingertips 5x20\" fingertips 5x20\" fingertips  5x20'' fingertips                               Therapeutic Activity             Pt edu             Leg press              Mini squats   nv 3x10 3x10 3x10 3x10  3x10    Step ups 4'' 3x10 4'' 3x12 4\" 3x12 6\" 3x10 6\" 3x10 6\" 3x10 6\" 3x10  6'' 3x10    Lateral step ups   4\" 3x10 6\" 3x10 6\" 2x10  1x5 (fatigue) 6\" 2x10 6\" 2x10  6'' 3x10    STS Hi low 3x10 Hi low-height of chair 3x10 Hi lo 3x12 Hi lo 3x12 Hi lo 3x12 2x10 2x10  2x10                                           Modalities             CP PRN                                                 "

## 2025-04-28 ENCOUNTER — OFFICE VISIT (OUTPATIENT)
Dept: PHYSICAL THERAPY | Facility: REHABILITATION | Age: 75
End: 2025-04-28
Payer: MEDICARE

## 2025-04-28 DIAGNOSIS — M16.12 UNILATERAL PRIMARY OSTEOARTHRITIS, LEFT HIP: ICD-10-CM

## 2025-04-28 DIAGNOSIS — Z96.642 STATUS POST TOTAL REPLACEMENT OF LEFT HIP: Primary | ICD-10-CM

## 2025-04-28 PROCEDURE — 97112 NEUROMUSCULAR REEDUCATION: CPT

## 2025-04-28 PROCEDURE — 97530 THERAPEUTIC ACTIVITIES: CPT

## 2025-04-28 PROCEDURE — 97140 MANUAL THERAPY 1/> REGIONS: CPT

## 2025-04-28 NOTE — PROGRESS NOTES
Daily Note     Today's date: 2025  Patient name: Trinh Christopher  : 1950  MRN: [id number]  Referring provider: Kole Peralta*  Dx:   Encounter Diagnosis     ICD-10-CM    1. Status post total replacement of left hip  Z96.642       2. Unilateral primary osteoarthritis, left hip  M16.12           Start Time: 1615  Stop Time: 1655  Total time in clinic (min): 40 minutes    Subjective: Patient reports her hip has been feeling pretty good as of late, some soreness noted occasionally on lateral aspect of hip. She is to follow up with MD May 15th.       Objective: See treatment diary below      Assessment: Tolerated treatment well. Patient demonstrated fatigue post treatment, exhibited good technique with therapeutic exercises, and would benefit from continued PT Did not complete certain TE secondary to patient requesting to leave session.       Plan: Continue per plan of care.  Progress treatment as tolerated.       Precautions:   Past Medical History:   Diagnosis Date    A-fib (HCC)     since epidural DCed pts been ok    Anxiety     Colon polyp     GERD (gastroesophageal reflux disease)     Hemorrhoids     Hyperlipidemia     Hypertension      ANTERIOR DANICA 25 - trim sutures post op day 10     Daily Treatment Diary     Manuals 3/25 3/27 4/1 4/3 4/8 4/10 4/14 4/16 4/23 4/28   PROM L hip - flx, gentle ER/IR, abd done done Done  Done Done  STEPHENS KSG Done  Done  Done + STM glut/ITB   Suture trimming             TE             Bike - ROM 5' lvl 1 5' lvl 1 5' lv 1 5' lv 1 5' 5' 5' 5' 5' lvl 1 5' lvl 1   Heel slides w strap             Gastroc stretch strap             Hamstring stretch                                       NMR             LAQ   Matrix nv Matrix LLE 15# 3x8 Matrix LLE 15# 3x8 Matrix LLE 15# 3x10 Matrix LLE 15# 3x10  Matrix LLE 15# 3x10 Matrix LLE 15# 3x12   Supine hip flx isos 20x5'' 20x5'' dc          SLR flexion w/ quad set Attempted, improved  2x6  3x5 2x8 2x10 2x10 2x10  2x10  "2x10   SLR hip abduction              Standing hip ext nv [place] TB 3x10 b/l Pink 3x12 b/l Pink 3x12 b/l Pink 3x10 b/l Pink 3x10 b/l Pink 3x10 b/l  Pink 3x10 b/l    Standing hip abd nv [place] TB 3x10 b/l Pink TB 3x12 b/l Pink 3x12 b/l Pink 3x10 b/l  Pink 3x10 b/l Pink 3x10 b/l  Pink 3x10 b/l    Standing march L nv Pink 3x10 Pink 3x12 L ONLY          TB side stepping nv Pink 5 laps table Pink 5 laps table Pink 5 laps table Can do nv        Bridging 3x15 10# 3x12 10# 3x12 10# 3x12 12# 3x12    12# 3x12 12# 3x15   Clamshells             Heel raises             SLS nv Use of fingertips 5''x10 10x no fingers to tolerance nv 5x20\" fingertips 5x20\" fingertips 5x20\" fingertips  5x20'' fingertips  5x20'' fingertips                             Therapeutic Activity             Pt edu             Leg press              Mini squats   nv 3x10 3x10 3x10 3x10  3x10 3x12   Step ups 4'' 3x10 4'' 3x12 4\" 3x12 6\" 3x10 6\" 3x10 6\" 3x10 6\" 3x10  6'' 3x10    Lateral step ups   4\" 3x10 6\" 3x10 6\" 2x10  1x5 (fatigue) 6\" 2x10 6\" 2x10  6'' 3x10 nv   STS Hi low 3x10 Hi low-height of chair 3x10 Hi lo 3x12 Hi lo 3x12 Hi lo 3x12 2x10 2x10  2x10 3x10                                          Modalities             CP PRN                                                   "

## 2025-04-30 ENCOUNTER — OFFICE VISIT (OUTPATIENT)
Dept: PHYSICAL THERAPY | Facility: REHABILITATION | Age: 75
End: 2025-04-30
Payer: MEDICARE

## 2025-04-30 DIAGNOSIS — M16.12 UNILATERAL PRIMARY OSTEOARTHRITIS, LEFT HIP: ICD-10-CM

## 2025-04-30 DIAGNOSIS — Z96.642 STATUS POST TOTAL REPLACEMENT OF LEFT HIP: Primary | ICD-10-CM

## 2025-04-30 PROCEDURE — 97530 THERAPEUTIC ACTIVITIES: CPT

## 2025-04-30 PROCEDURE — 97112 NEUROMUSCULAR REEDUCATION: CPT

## 2025-04-30 PROCEDURE — 97140 MANUAL THERAPY 1/> REGIONS: CPT

## 2025-04-30 NOTE — PROGRESS NOTES
"Daily Note     Today's date: 2025  Patient name: Trinh Christopher  : 1950  MRN: [id number]  Referring provider: Kole Peralta*  Dx:   Encounter Diagnosis     ICD-10-CM    1. Status post total replacement of left hip  Z96.642       2. Unilateral primary osteoarthritis, left hip  M16.12           Start Time: 1615  Stop Time: 1700  Total time in clinic (min): 45 minutes    Subjective: Patient reports her hip as \"pretty good\" at start of session.       Objective: See treatment diary below      Assessment: Tolerated treatment well. Patient demonstrated fatigue post treatment, exhibited good technique with therapeutic exercises, and would benefit from continued PT Patient will transition to once a week until following up with MD in about two weeks.       Plan: Continue per plan of care.  Progress treatment as tolerated.       Precautions:   Past Medical History:   Diagnosis Date    A-fib (HCC)     since epidural DCed pts been ok    Anxiety     Colon polyp     GERD (gastroesophageal reflux disease)     Hemorrhoids     Hyperlipidemia     Hypertension      ANTERIOR DANICA 25 - trim sutures post op day 10     Daily Treatment Diary     Manuals 4/30  4/1 4/3 4/8 4/10 4/14 4/16 4/23 4/28   PROM L hip - flx, gentle ER/IR, abd Done   Done  Done Done  STEPHENS KSG Done  Done  Done + STM glut/ITB   Suture trimming             TE             Bike - ROM 5' lvl 1  5' lv 1 5' lv 1 5' 5' 5' 5' 5' lvl 1 5' lvl 1   Heel slides w strap             Gastroc stretch strap             Hamstring stretch                                       NMR             LAQ Matrix LLE 15# 3x12  Matrix nv Matrix LLE 15# 3x8 Matrix LLE 15# 3x8 Matrix LLE 15# 3x10 Matrix LLE 15# 3x10  Matrix LLE 15# 3x10 Matrix LLE 15# 3x12   Supine hip flx isos   dc          SLR flexion w/ quad set 3x10  3x5 2x8 2x10 2x10 2x10  2x10 2x10   SLR hip abduction              Standing hip ext GTB 2x10 b/l  Pink 3x12 b/l Pink 3x12 b/l Pink 3x10 b/l Pink 3x10 " "b/l Pink 3x10 b/l  Pink 3x10 b/l    Standing hip abd GTB 2x10 b/l  Pink TB 3x12 b/l Pink 3x12 b/l Pink 3x10 b/l  Pink 3x10 b/l Pink 3x10 b/l  Pink 3x10 b/l    Standing march L   Pink 3x12 L ONLY          TB side stepping GTB 5 laps mirror  Pink 5 laps table Pink 5 laps table Can do nv        Bridging 12# 3x15  10# 3x12 10# 3x12 12# 3x12    12# 3x12 12# 3x15   Clamshells             Heel raises             SLS 5x20'' fingertips  10x no fingers to tolerance nv 5x20\" fingertips 5x20\" fingertips 5x20\" fingertips  5x20'' fingertips  5x20'' fingertips                             Therapeutic Activity             Pt edu             Leg press              Mini squats   nv 3x10 3x10 3x10 3x10  3x10 3x12   Step ups 6'' 3x12  4\" 3x12 6\" 3x10 6\" 3x10 6\" 3x10 6\" 3x10  6'' 3x10    Lateral step ups 6'' 2x10 overs  4\" 3x10 6\" 3x10 6\" 2x10  1x5 (fatigue) 6\" 2x10 6\" 2x10  6'' 3x10 nv   STS 3x10  Hi lo 3x12 Hi lo 3x12 Hi lo 3x12 2x10 2x10  2x10 3x10                                          Modalities             CP PRN                                                     "

## 2025-05-07 ENCOUNTER — OFFICE VISIT (OUTPATIENT)
Dept: PHYSICAL THERAPY | Facility: REHABILITATION | Age: 75
End: 2025-05-07
Attending: FAMILY MEDICINE
Payer: MEDICARE

## 2025-05-07 DIAGNOSIS — M16.12 UNILATERAL PRIMARY OSTEOARTHRITIS, LEFT HIP: ICD-10-CM

## 2025-05-07 DIAGNOSIS — Z96.642 STATUS POST TOTAL REPLACEMENT OF LEFT HIP: Primary | ICD-10-CM

## 2025-05-07 PROCEDURE — 97530 THERAPEUTIC ACTIVITIES: CPT

## 2025-05-07 PROCEDURE — 97112 NEUROMUSCULAR REEDUCATION: CPT

## 2025-05-07 PROCEDURE — 97140 MANUAL THERAPY 1/> REGIONS: CPT

## 2025-05-07 NOTE — PROGRESS NOTES
Daily Note     Today's date: 2025  Patient name: Trinh Christopher  : 1950  MRN: [id number]  Referring provider: Kole Peralta*  Dx:   Encounter Diagnosis     ICD-10-CM    1. Status post total replacement of left hip  Z96.642       2. Unilateral primary osteoarthritis, left hip  M16.12           Start Time: 1642  Stop Time: 1738  Total time in clinic (min): 56 minutes    Subjective: Patient reports some groin soreness at start of session, stating she was at an anniversary party over the weekend where she danced a lot and was wearing a slight wedge shoe. Patient is to follow up with MD next week.       Objective: See treatment diary below      Assessment: Tolerated treatment well. Patient demonstrated fatigue post treatment, exhibited good technique with therapeutic exercises, and would benefit from continued PT      Plan: Continue per plan of care.      Precautions:   Past Medical History:   Diagnosis Date    A-fib (HCC)     since epidural DCed pts been ok    Anxiety     Colon polyp     GERD (gastroesophageal reflux disease)     Hemorrhoids     Hyperlipidemia     Hypertension      ANTERIOR DANICA 25 - trim sutures post op day 10     Daily Treatment Diary     Manuals 4/30 5/7  4/3 4/8 4/10 4/14 4/16 4/23 4/28   PROM L hip - flx, gentle ER/IR, abd Done  Done   Done Done  STEPHENS KSG Done  Done  Done + STM glut/ITB   Suture trimming             TE             Bike - ROM 5' lvl 1 5' lvl 1  5' lv 1 5' 5' 5' 5' 5' lvl 1 5' lvl 1   Heel slides w strap             Gastroc stretch strap             Hamstring stretch                                       NMR             LAQ Matrix LLE 15# 3x12 Matrix LLE 15# 3x15   Matrix LLE 15# 3x8 Matrix LLE 15# 3x8 Matrix LLE 15# 3x10 Matrix LLE 15# 3x10  Matrix LLE 15# 3x10 Matrix LLE 15# 3x12   Supine hip flx isos             SLR flexion w/ quad set 3x10 3x10 inc nv  2x8 2x10 2x10 2x10  2x10 2x10   SLR hip abduction              Standing hip ext GTB 2x10 b/l GTB  "3x12 b/l  Pink 3x12 b/l Pink 3x10 b/l Pink 3x10 b/l Pink 3x10 b/l  Pink 3x10 b/l    Standing hip abd GTB 2x10 b/l GTB 3x12 b/l  Pink 3x12 b/l Pink 3x10 b/l  Pink 3x10 b/l Pink 3x10 b/l  Pink 3x10 b/l    Standing march L             TB side stepping GTB 5 laps mirror GTB 5 laps mirror  Pink 5 laps table Can do nv        Bridging 12# 3x15 12# 3x15  10# 3x12 12# 3x12    12# 3x12 12# 3x15   Clamshells             Heel raises             SLS 5x20'' fingertips 5x20'' fingertips  nv 5x20\" fingertips 5x20\" fingertips 5x20\" fingertips  5x20'' fingertips  5x20'' fingertips                             Therapeutic Activity             Pt edu             Leg press              Mini squats    3x10 3x10 3x10 3x10  3x10 3x12   Step ups 6'' 3x12 nv  6\" 3x10 6\" 3x10 6\" 3x10 6\" 3x10  6'' 3x10    Lateral step ups 6'' 2x10 overs 6'' 2x10 overs  6\" 3x10 6\" 2x10  1x5 (fatigue) 6\" 2x10 6\" 2x10  6'' 3x10 nv   STS 3x10 3x10  Hi lo 3x12 Hi lo 3x12 2x10 2x10  2x10 3x10                                          Modalities             CP PRN   MHP done                                                     "

## 2025-05-14 ENCOUNTER — APPOINTMENT (OUTPATIENT)
Dept: PHYSICAL THERAPY | Facility: REHABILITATION | Age: 75
End: 2025-05-14
Attending: FAMILY MEDICINE
Payer: MEDICARE

## 2025-05-29 ENCOUNTER — TELEPHONE (OUTPATIENT)
Age: 75
End: 2025-05-29

## 2025-05-29 NOTE — TELEPHONE ENCOUNTER
Patient called to be sure there was nothing sooner with Dr Singh to establish care. There is not and the patient is on a waiting list.

## 2025-06-06 PROBLEM — I48.0 PAROXYSMAL ATRIAL FIBRILLATION (HCC): Status: RESOLVED | Noted: 2025-06-06 | Resolved: 2025-06-06

## 2025-06-06 PROBLEM — K21.9 GASTROESOPHAGEAL REFLUX DISEASE WITHOUT ESOPHAGITIS: Status: ACTIVE | Noted: 2025-06-06

## 2025-06-06 PROBLEM — I10 PRIMARY HYPERTENSION: Status: ACTIVE | Noted: 2025-06-06

## 2025-06-06 PROBLEM — I48.0 PAROXYSMAL ATRIAL FIBRILLATION (HCC): Status: ACTIVE | Noted: 2025-06-06

## 2025-06-06 PROBLEM — R73.03 PREDIABETES: Status: ACTIVE | Noted: 2025-06-06

## 2025-06-06 PROBLEM — M85.89 OSTEOPENIA OF MULTIPLE SITES: Status: ACTIVE | Noted: 2025-06-06

## 2025-06-06 PROBLEM — H81.13 BENIGN PAROXYSMAL POSITIONAL VERTIGO DUE TO BILATERAL VESTIBULAR DISORDER: Status: ACTIVE | Noted: 2025-06-06

## 2025-06-06 PROBLEM — E78.5 DYSLIPIDEMIA: Status: ACTIVE | Noted: 2025-06-06

## 2025-06-06 PROBLEM — Z00.00 MEDICARE ANNUAL WELLNESS VISIT, SUBSEQUENT: Status: ACTIVE | Noted: 2025-06-06

## 2025-06-06 PROBLEM — R73.09 ABNORMAL GLUCOSE: Status: RESOLVED | Noted: 2025-06-06 | Resolved: 2025-06-06

## 2025-06-06 PROBLEM — R73.09 ABNORMAL GLUCOSE: Status: ACTIVE | Noted: 2025-06-06

## 2025-06-06 NOTE — PROGRESS NOTES
Name: Trinh Christopher      : 1950      MRN: [id number]  Encounter Provider: Jamila Singh MD  Encounter Date: 2025   Encounter department: [facility] MEDICINE  :  Assessment & Plan  Medicare annual wellness visit, subsequent  Reviewed and diagnostics ordered          Need for hepatitis C screening test         Postmenopausal  Due for dexa last   Orders:    DXA bone density spine hip and pelvis; Future    Prediabetes  Hga1c 5.9   Discussion on diet and exercise guidelines for weight loss and  health reviewed with pt            Osteopenia of multiple sites  Osteopenia on dexa scan . Start Vit D 4520-4138 units  daily and low fat calcium rich foods; weight bearing exercise is advised            Dyslipidemia  LDL not at goal cardiologist will consider adding zetia after next test          Gastroesophageal reflux disease without esophagitis  Ok on omeprazole          Benign paroxysmal positional vertigo due to bilateral vestibular disorder  Meclizine prn    Orders:    meclizine (ANTIVERT) 12.5 MG tablet; Take 1 tablet (12.5 mg total) by mouth 3 (three) times a day as needed for dizziness    Encounter for immunization         Primary hypertension  Well controlled on current therapy continue with current medications and will reassess next visit             History of colon polyps  Colonoscopy UTD         Class 1 obesity due to excess calories with serious comorbidity and body mass index (BMI) of 32.0 to 32.9 in adult      Orders:    tirzepatide (Zepbound) 2.5 mg/0.5 mL auto-injector; Inject 0.5 mL (2.5 mg total) under the skin once a week for 28 days    Obesity (BMI 30-39.9)  Pt wishes to try glp-1   zepbound 2.5mg weekly side effects explained N/V/D abd pain muscle mass loss hair  loss gastroparesis           Irritable bowel syndrome with diarrhea  Pt has  used lomotil  rarely prn     Orders:    diphenoxylate-atropine (LOMOTIL) 2.5-0.025 mg per tablet; Take 1 tablet by mouth 4  "(four) times a day as needed for diarrhea           History of Present Illness   New pt here to establish care due for medicare wellness multiple medical -problems followed by cardiology [facility]      Review of Systems   Constitutional:  Negative for appetite change, chills, fatigue and fever.   Respiratory:  Negative for cough, chest tightness and shortness of breath.    Cardiovascular:  Negative for chest pain, palpitations and leg swelling.   Gastrointestinal:  Negative for abdominal pain, constipation, diarrhea, nausea and vomiting.   Genitourinary:  Negative for difficulty urinating and frequency.   Musculoskeletal:  Negative for arthralgias, back pain, gait problem and neck pain.   Skin:  Negative for rash.   Neurological:  Negative for dizziness, weakness, light-headedness, numbness and headaches.   Hematological:  Does not bruise/bleed easily.   Psychiatric/Behavioral:  Negative for dysphoric mood and sleep disturbance. The patient is not nervous/anxious.        Objective   /82 (BP Location: Left arm, Patient Position: Sitting, Cuff Size: Standard)   Pulse 77   Temp 98.3 °F (36.8 °C) (Tympanic)   Resp 16   Ht 5' 1\" (1.549 m)   Wt 78 kg (172 lb)   SpO2 97%   BMI 32.50 kg/m²      Physical Exam  Constitutional:       General: She is not in acute distress.     Appearance: Normal appearance. She is normal weight.   Neck:      Thyroid: No thyromegaly.      Vascular: No carotid bruit.     Cardiovascular:      Rate and Rhythm: Normal rate and regular rhythm.      Heart sounds: Normal heart sounds. No murmur heard.  Pulmonary:      Effort: Pulmonary effort is normal. No respiratory distress.      Breath sounds: Normal breath sounds. No wheezing, rhonchi or rales.   Abdominal:      Palpations: Abdomen is soft.      Tenderness: There is no abdominal tenderness.     Musculoskeletal:      Cervical back: Normal range of motion and neck supple.      Right lower leg: No edema.      Left lower leg: No " edema.     Skin:     Findings: No rash.     Neurological:      General: No focal deficit present.      Mental Status: She is alert and oriented to person, place, and time. Mental status is at baseline.      Gait: Gait normal.     Psychiatric:         Mood and Affect: Mood normal.         Behavior: Behavior normal.       Prior Authorization Clinical Questions for Weight Management Pharmacotherapy    1. Does the patient have a contrainidcation to medication prescribed for weight management?: No  2. Does the patient have a diagnosis of obesity, confirmed by a BMI greater than or equal to 30 kg/m^2?: Yes  3. Does the patient have a BMI of greater than or equal to 27 kg/m^2 with at least one weight-related comorbidity/risk factor/complication (e.g. diabetes, dyslipidemia, coronary artery disease)?: Yes  4. Weight-related co-morbidities/risk factors: prediabetes, metabolic syndrome, dyslipidemia, hypertension, GERD  5. WEGOVY CVA Indication: Does patient have established documented cardiovascular disease (history of a prior heart attack (myocardial infarction), stroke, or symptomatic peripheral arterial disease (PAD)?: No  6. ZEPBOUND MIRNA Indication: Does patient have documented MIRNA diagnosed via sleep study (insurance will require copy of sleep study results for approval)?: No  7. Has the patient been on a weight loss regimen of low-calorie diet, increased physical activity, and lifestyle modifications for a minimum of 6 months?: Yes  8. Has the patient completed a comprehensive weight loss program (ie, Weight Watchers, Noom, Bariatrics, other kesha on phone)? If so, what?: Yes  9. Does the patient have a history of type 2 diabetes?: No  10. Has the member tried and failed other weight loss medication within the past 12 months?: No  11. Will the member use requested medication in combination with another GLP agonist or weight loss drug?: No  12. Is the medication a controlled substance?: No     Baseline weight (in pounds):  172 lbs  Current weight (in pounds): 172 lbs  Weight loss percentage: 0%

## 2025-06-06 NOTE — ASSESSMENT & PLAN NOTE
Well controlled on current therapy continue with current medications and will reassess next visit

## 2025-06-06 NOTE — ASSESSMENT & PLAN NOTE
Osteopenia on dexa scan . Start Vit D 3122-5809 units  daily and low fat calcium rich foods; weight bearing exercise is advised

## 2025-06-06 NOTE — ASSESSMENT & PLAN NOTE
Hga1c 5.9  1/25 Discussion on diet and exercise guidelines for weight loss and  health reviewed with pt

## 2025-06-06 NOTE — ASSESSMENT & PLAN NOTE
Meclizine prn    Orders:    meclizine (ANTIVERT) 12.5 MG tablet; Take 1 tablet (12.5 mg total) by mouth 3 (three) times a day as needed for dizziness

## 2025-06-11 ENCOUNTER — OFFICE VISIT (OUTPATIENT)
Dept: FAMILY MEDICINE CLINIC | Facility: CLINIC | Age: 75
End: 2025-06-11
Payer: MEDICARE

## 2025-06-11 VITALS
SYSTOLIC BLOOD PRESSURE: 134 MMHG | HEIGHT: 61 IN | TEMPERATURE: 98.3 F | OXYGEN SATURATION: 97 % | WEIGHT: 172 LBS | RESPIRATION RATE: 16 BRPM | DIASTOLIC BLOOD PRESSURE: 82 MMHG | HEART RATE: 77 BPM | BODY MASS INDEX: 32.47 KG/M2

## 2025-06-11 DIAGNOSIS — Z23 ENCOUNTER FOR IMMUNIZATION: ICD-10-CM

## 2025-06-11 DIAGNOSIS — H81.13 BENIGN PAROXYSMAL POSITIONAL VERTIGO DUE TO BILATERAL VESTIBULAR DISORDER: ICD-10-CM

## 2025-06-11 DIAGNOSIS — E66.811 CLASS 1 OBESITY DUE TO EXCESS CALORIES WITH SERIOUS COMORBIDITY AND BODY MASS INDEX (BMI) OF 32.0 TO 32.9 IN ADULT: ICD-10-CM

## 2025-06-11 DIAGNOSIS — M85.89 OSTEOPENIA OF MULTIPLE SITES: ICD-10-CM

## 2025-06-11 DIAGNOSIS — E66.09 CLASS 1 OBESITY DUE TO EXCESS CALORIES WITH SERIOUS COMORBIDITY AND BODY MASS INDEX (BMI) OF 32.0 TO 32.9 IN ADULT: ICD-10-CM

## 2025-06-11 DIAGNOSIS — Z11.59 NEED FOR HEPATITIS C SCREENING TEST: ICD-10-CM

## 2025-06-11 DIAGNOSIS — Z86.0100 HISTORY OF COLON POLYPS: ICD-10-CM

## 2025-06-11 DIAGNOSIS — K21.9 GASTROESOPHAGEAL REFLUX DISEASE WITHOUT ESOPHAGITIS: ICD-10-CM

## 2025-06-11 DIAGNOSIS — Z78.0 POSTMENOPAUSAL: ICD-10-CM

## 2025-06-11 DIAGNOSIS — Z00.00 MEDICARE ANNUAL WELLNESS VISIT, SUBSEQUENT: Primary | ICD-10-CM

## 2025-06-11 DIAGNOSIS — R73.03 PREDIABETES: ICD-10-CM

## 2025-06-11 DIAGNOSIS — E78.5 DYSLIPIDEMIA: ICD-10-CM

## 2025-06-11 DIAGNOSIS — I10 PRIMARY HYPERTENSION: ICD-10-CM

## 2025-06-11 DIAGNOSIS — K58.0 IRRITABLE BOWEL SYNDROME WITH DIARRHEA: ICD-10-CM

## 2025-06-11 DIAGNOSIS — E66.9 OBESITY (BMI 30-39.9): ICD-10-CM

## 2025-06-11 PROCEDURE — G0439 PPPS, SUBSEQ VISIT: HCPCS | Performed by: FAMILY MEDICINE

## 2025-06-11 PROCEDURE — 99204 OFFICE O/P NEW MOD 45 MIN: CPT | Performed by: FAMILY MEDICINE

## 2025-06-11 RX ORDER — TIRZEPATIDE 2.5 MG/.5ML
2.5 INJECTION, SOLUTION SUBCUTANEOUS WEEKLY
Qty: 2 ML | Refills: 2 | Status: SHIPPED | OUTPATIENT
Start: 2025-06-11 | End: 2025-06-12

## 2025-06-11 RX ORDER — AMOXICILLIN 250 MG
1 CAPSULE ORAL 2 TIMES DAILY
COMMUNITY
Start: 2025-02-18 | End: 2026-02-18

## 2025-06-11 RX ORDER — DIPHENOXYLATE HYDROCHLORIDE AND ATROPINE SULFATE 2.5; .025 MG/1; MG/1
1 TABLET ORAL 4 TIMES DAILY PRN
Qty: 30 TABLET | Refills: 0 | Status: SHIPPED | OUTPATIENT
Start: 2025-06-11

## 2025-06-11 RX ORDER — MECLIZINE HCL 12.5 MG 12.5 MG/1
12.5 TABLET ORAL 3 TIMES DAILY PRN
Qty: 30 TABLET | Refills: 3 | Status: SHIPPED | OUTPATIENT
Start: 2025-06-11

## 2025-06-11 NOTE — PROGRESS NOTES
Name: Trinh Christopher      : 1950      MRN: [id number]  Encounter Provider: Jamila Singh MD  Encounter Date: 2025   Encounter department: [facility] FAMILY MEDICINE  :  Assessment & Plan       Preventive health issues were discussed with patient, and age appropriate screening tests were ordered as noted in patient's After Visit Summary. Personalized health advice and appropriate referrals for health education or preventive services given if needed, as noted in patient's After Visit Summary.    History of Present Illness     HPI   Patient Care Team:  Tanmay Morgan MD as Endoscopist    Review of Systems  Medical History Reviewed by provider this encounter:       Annual Wellness Visit Questionnaire   Trinh is here for her Subsequent Wellness visit.     Health Risk Assessment:   Patient rates overall health as good. Patient feels that their physical health rating is slightly better. Patient is very satisfied with their life. Eyesight was rated as same. Hearing was rated as slightly worse. Patient feels that their emotional and mental health rating is same. Patients states they are never, rarely angry. Patient states they are never, rarely unusually tired/fatigued. Pain experienced in the last 7 days has been none. Patient states that she has experienced no weight loss or gain in last 6 months.     Depression Screening:   PHQ-2 Score: 0      Fall Risk Screening:   In the past year, patient has experienced: no history of falling in past year      Urinary Incontinence Screening:   Patient has leaked urine accidently in the last six months.     Home Safety:  Patient does not have trouble with stairs inside or outside of their home. Patient has working smoke alarms and has working carbon monoxide detector. Home safety hazards include: none.     Nutrition:   Current diet is Regular.     Medications:   Patient is currently taking over-the-counter supplements. OTC medications include: see  medication list. Patient is able to manage medications.     Activities of Daily Living (ADLs)/Instrumental Activities of Daily Living (IADLs):   Walk and transfer into and out of bed and chair?: Yes  Dress and groom yourself?: Yes    Bathe or shower yourself?: Yes    Feed yourself? Yes  Do your laundry/housekeeping?: Yes  Manage your money, pay your bills and track your expenses?: Yes  Make your own meals?: Yes    Do your own shopping?: Yes    Previous Hospitalizations:   Any hospitalizations or ED visits within the last 12 months?: Yes    How many hospitalizations have you had in the last year?: 1-2    Advance Care Planning:   Living will: No    Durable POA for healthcare: No    Advanced directive: No      Cognitive Screening:   Provider or family/friend/caregiver concerned regarding cognition?: No    Preventive Screenings      Cardiovascular Screening:    General: Screening Current      Diabetes Screening:     General: Screening Current      Colorectal Cancer Screening:     General: Screening Current      Breast Cancer Screening:     General: Screening Current      Cervical Cancer Screening:    General: Screening Not Indicated and Screening Current      Osteoporosis Screening:    General: Risks and Benefits Discussed    Due for: DXA Axial      Abdominal Aortic Aneurysm (AAA) Screening:        General: Screening Not Indicated      Lung Cancer Screening:     General: Screening Not Indicated      Hepatitis C Screening:    General: Risks and Benefits Discussed    Hep C Screening Accepted: Yes      Screening, Brief Intervention, and Referral to Treatment (SBIRT)     Screening      AUDIT-C Screenin) How often did you have a drink containing alcohol in the past year? monthly or less  2) How many drinks did you have on a typical day when you were drinking in the past year? 1 to 2    Single Item Drug Screening:  How often have you used an illegal drug (including marijuana) or a prescription medication for non-medical  "reasons in the past year? never    Single Item Drug Screen Score: 0  Interpretation: Negative screen for possible drug use disorder    Social Drivers of Health     Financial Resource Strain: Not At Risk (2/18/2025)    Received from [facility]    Financial Insecurity     In the last 12 months did you skip medications to save money?: No     In the last 12 months was there a time when you needed to see a doctor but could not because of cost?: No   Food Insecurity: No Food Insecurity (2/18/2025)    Received from [facility]    Food Insecurity     In the last 12 months did you ever eat less than you felt you should because there wasn't enough money for food?: No   Transportation Needs: No Transportation Needs (2/18/2025)    Received from [facility]    Transportation Needs     In the last 12 months have you ever had to go without healthcare because you didn't have a way to get there?: No   Housing Stability: Not At Risk (2/18/2025)    Received from [facility]    Housing Stability     Are you worried that in the next 2 months you may not have stable housing?: No   Utilities: Not At Risk (2/18/2025)    Received from [facility]    Utilities Insecurity     In the last 12 months has the electric, gas, oil, or water company threatened to shut off services in your home?: No     No results found.    Objective   /82 (BP Location: Left arm, Patient Position: Sitting, Cuff Size: Standard)   Pulse 77   Temp 98.3 °F (36.8 °C) (Tympanic)   Resp 16   Ht 5' 1\" (1.549 m)   Wt 78 kg (172 lb)   SpO2 97%   BMI 32.50 kg/m²     Physical Exam    "

## 2025-06-11 NOTE — ASSESSMENT & PLAN NOTE
Pt has  used lomotil  rarely prn     Orders:    diphenoxylate-atropine (LOMOTIL) 2.5-0.025 mg per tablet; Take 1 tablet by mouth 4 (four) times a day as needed for diarrhea

## 2025-06-11 NOTE — PATIENT INSTRUCTIONS
Medicare Preventive Visit Patient Instructions  Thank you for completing your Welcome to Medicare Visit or Medicare Annual Wellness Visit today. Your next wellness visit will be due in one year (6/12/2026).  The screening/preventive services that you may require over the next 5-10 years are detailed below. Some tests may not apply to you based off risk factors and/or age. Screening tests ordered at today's visit but not completed yet may show as past due. Also, please note that scanned in results may not display below.  Preventive Screenings:  Service Recommendations Previous Testing/Comments   Colorectal Cancer Screening  * Colonoscopy    * Fecal Occult Blood Test (FOBT)/Fecal Immunochemical Test (FIT)  * Fecal DNA/Cologuard Test  * Flexible Sigmoidoscopy Age: 45-75 years old   Colonoscopy: every 10 years (may be performed more frequently if at higher risk)  OR  FOBT/FIT: every 1 year  OR  Cologuard: every 3 years  OR  Sigmoidoscopy: every 5 years  Screening may be recommended earlier than age 45 if at higher risk for colorectal cancer. Also, an individualized decision between you and your healthcare provider will decide whether screening between the ages of 76-85 would be appropriate. Colonoscopy: 08/22/2017  FOBT/FIT: Not on file  Cologuard: Not on file  Sigmoidoscopy: Not on file    Screening Current     Breast Cancer Screening Age: 40+ years old  Frequency: every 1-2 years  Not required if history of left and right mastectomy Mammogram: 12/16/2024    Screening Current   Cervical Cancer Screening Between the ages of 21-29, pap smear recommended once every 3 years.   Between the ages of 30-65, can perform pap smear with HPV co-testing every 5 years.   Recommendations may differ for women with a history of total hysterectomy, cervical cancer, or abnormal pap smears in past. Pap Smear: 09/28/2022    Screening Not Indicated  Screening Current   Hepatitis C Screening Once for adults born between 1945 and 1965  More  frequently in patients at high risk for Hepatitis C Hep C Antibody: Not on file    Risks and Benefits Discussed  Due for Hepatitis C screening   Diabetes Screening 1-2 times per year if you're at risk for diabetes or have pre-diabetes Fasting glucose: No results in last 5 years (No results in last 5 years)  A1C: 5.9 % (6/9/2025)  Screening Current   Cholesterol Screening Once every 5 years if you don't have a lipid disorder. May order more often based on risk factors. Lipid panel: 06/09/2025    Screening Current     Other Preventive Screenings Covered by Medicare:  Abdominal Aortic Aneurysm (AAA) Screening: covered once if your at risk. You're considered to be at risk if you have a family history of AAA.  Lung Cancer Screening: covers low dose CT scan once per year if you meet all of the following conditions: (1) Age 55-77; (2) No signs or symptoms of lung cancer; (3) Current smoker or have quit smoking within the last 15 years; (4) You have a tobacco smoking history of at least 20 pack years (packs per day multiplied by number of years you smoked); (5) You get a written order from a healthcare provider.  Glaucoma Screening: covered annually if you're considered high risk: (1) You have diabetes OR (2) Family history of glaucoma OR (3)  aged 50 and older OR (4)  American aged 65 and older  Osteoporosis Screening: covered every 2 years if you meet one of the following conditions: (1) You're estrogen deficient and at risk for osteoporosis based off medical history and other findings; (2) Have a vertebral abnormality; (3) On glucocorticoid therapy for more than 3 months; (4) Have primary hyperparathyroidism; (5) On osteoporosis medications and need to assess response to drug therapy.   Last bone density test (DXA Scan): Not on file.  HIV Screening: covered annually if you're between the age of 15-65. Also covered annually if you are younger than 15 and older than 65 with risk factors for HIV  infection. For pregnant patients, it is covered up to 3 times per pregnancy.    Immunizations:  Immunization Recommendations   Influenza Vaccine Annual influenza vaccination during flu season is recommended for all persons aged >= 6 months who do not have contraindications   Pneumococcal Vaccine   * Pneumococcal conjugate vaccine = PCV13 (Prevnar 13), PCV15 (Vaxneuvance), PCV20 (Prevnar 20)  * Pneumococcal polysaccharide vaccine = PPSV23 (Pneumovax) Adults 19-65 yo with certain risk factors or if 65+ yo  If never received any pneumonia vaccine: recommend Prevnar 20 (PCV20)  Give PCV20 if previously received 1 dose of PCV13 or PPSV23   Hepatitis B Vaccine 3 dose series if at intermediate or high risk (ex: diabetes, end stage renal disease, liver disease)   Respiratory syncytial virus (RSV) Vaccine - COVERED BY MEDICARE PART D  * RSVPreF3 (Arexvy) CDC recommends that adults 60 years of age and older may receive a single dose of RSV vaccine using shared clinical decision-making (SCDM)   Tetanus (Td) Vaccine - COST NOT COVERED BY MEDICARE PART B Following completion of primary series, a booster dose should be given every 10 years to maintain immunity against tetanus. Td may also be given as tetanus wound prophylaxis.   Tdap Vaccine - COST NOT COVERED BY MEDICARE PART B Recommended at least once for all adults. For pregnant patients, recommended with each pregnancy.   Shingles Vaccine (Shingrix) - COST NOT COVERED BY MEDICARE PART B  2 shot series recommended in those 19 years and older who have or will have weakened immune systems or those 50 years and older     Health Maintenance Due:      Topic Date Due   • Hepatitis C Screening  Never done   • Breast Cancer Screening: Mammogram  12/16/2025   • Colorectal Cancer Screening  08/22/2027     Immunizations Due:      Topic Date Due   • COVID-19 Vaccine (3 - 2024-25 season) 09/01/2024   • Influenza Vaccine (Season Ended) 09/01/2025     Advance Directives   What are advance  directives?  Advance directives are legal documents that state your wishes and plans for medical care. These plans are made ahead of time in case you lose your ability to make decisions for yourself. Advance directives can apply to any medical decision, such as the treatments you want, and if you want to donate organs.   What are the types of advance directives?  There are many types of advance directives, and each state has rules about how to use them. You may choose a combination of any of the following:  Living will:  This is a written record of the treatment you want. You can also choose which treatments you do not want, which to limit, and which to stop at a certain time. This includes surgery, medicine, IV fluid, and tube feedings.   Durable power of  for healthcare (DPAHC):  This is a written record that states who you want to make healthcare choices for you when you are unable to make them for yourself. This person, called a proxy, is usually a family member or a friend. You may choose more than 1 proxy.  Do not resuscitate (DNR) order:  A DNR order is used in case your heart stops beating or you stop breathing. It is a request not to have certain forms of treatment, such as CPR. A DNR order may be included in other types of advance directives.  Medical directive:  This covers the care that you want if you are in a coma, near death, or unable to make decisions for yourself. You can list the treatments you want for each condition. Treatment may include pain medicine, surgery, blood transfusions, dialysis, IV or tube feedings, and a ventilator (breathing machine).  Values history:  This document has questions about your views, beliefs, and how you feel and think about life. This information can help others choose the care that you would choose.  Why are advance directives important?  An advance directive helps you control your care. Although spoken wishes may be used, it is better to have your wishes  written down. Spoken wishes can be misunderstood, or not followed. Treatments may be given even if you do not want them. An advance directive may make it easier for your family to make difficult choices about your care.   Urinary Incontinence   Urinary incontinence (UI)  is when you lose control of your bladder. UI develops because your bladder cannot store or empty urine properly. The 3 most common types of UI are stress incontinence, urge incontinence, or both.  Medicines:   May be given to help strengthen your bladder control. Report any side effects of medication to your healthcare provider.  Do pelvic muscle exercises often:  Your pelvic muscles help you stop urinating. Squeeze these muscles tight for 5 seconds, then relax for 5 seconds. Gradually work up to squeezing for 10 seconds. Do 3 sets of 15 repetitions a day, or as directed. This will help strengthen your pelvic muscles and improve bladder control.  Train your bladder:  Go to the bathroom at set times, such as every 2 hours, even if you do not feel the urge to go. You can also try to hold your urine when you feel the urge to go. For example, hold your urine for 5 minutes when you feel the urge to go. As that becomes easier, hold your urine for 10 minutes.   Self-care:   Keep a UI record.  Write down how often you leak urine and how much you leak. Make a note of what you were doing when you leaked urine.  Drink liquids as directed. You may need to limit the amount of liquid you drink to help control your urine leakage. Do not drink any liquid right before you go to bed. Limit or do not have drinks that contain caffeine or alcohol.   Prevent constipation.  Eat a variety of high-fiber foods. Good examples are high-fiber cereals, beans, vegetables, and whole-grain breads. Walking is the best way to trigger your intestines to have a bowel movement.  Exercise regularly and maintain a healthy weight.  Weight loss and exercise will decrease pressure on your  bladder and help you control your leakage.   Use a catheter as directed  to help empty your bladder. A catheter is a tiny, plastic tube that is put into your bladder to drain your urine.   Go to behavior therapy as directed.  Behavior therapy may be used to help you learn to control your urge to urinate.    Weight Management   Why it is important to manage your weight:  Being overweight increases your risk of health conditions such as heart disease, high blood pressure, type 2 diabetes, and certain types of cancer. It can also increase your risk for osteoarthritis, sleep apnea, and other respiratory problems. Aim for a slow, steady weight loss. Even a small amount of weight loss can lower your risk of health problems.  How to lose weight safely:  A safe and healthy way to lose weight is to eat fewer calories and get regular exercise. You can lose up about 1 pound a week by decreasing the number of calories you eat by 500 calories each day.   Healthy meal plan for weight management:  A healthy meal plan includes a variety of foods, contains fewer calories, and helps you stay healthy. A healthy meal plan includes the following:  Eat whole-grain foods more often.  A healthy meal plan should contain fiber. Fiber is the part of grains, fruits, and vegetables that is not broken down by your body. Whole-grain foods are healthy and provide extra fiber in your diet. Some examples of whole-grain foods are whole-wheat breads and pastas, oatmeal, brown rice, and bulgur.  Eat a variety of vegetables every day.  Include dark, leafy greens such as spinach, kale, chloe greens, and mustard greens. Eat yellow and orange vegetables such as carrots, sweet potatoes, and winter squash.   Eat a variety of fruits every day.  Choose fresh or canned fruit (canned in its own juice or light syrup) instead of juice. Fruit juice has very little or no fiber.  Eat low-fat dairy foods.  Drink fat-free (skim) milk or 1% milk. Eat fat-free yogurt  and low-fat cottage cheese. Try low-fat cheeses such as mozzarella and other reduced-fat cheeses.  Choose meat and other protein foods that are low in fat.  Choose beans or other legumes such as split peas or lentils. Choose fish, skinless poultry (chicken or turkey), or lean cuts of red meat (beef or pork). Before you cook meat or poultry, cut off any visible fat.   Use less fat and oil.  Try baking foods instead of frying them. Add less fat, such as margarine, sour cream, regular salad dressing and mayonnaise to foods. Eat fewer high-fat foods. Some examples of high-fat foods include french fries, doughnuts, ice cream, and cakes.  Eat fewer sweets.  Limit foods and drinks that are high in sugar. This includes candy, cookies, regular soda, and sweetened drinks.  Exercise:  Exercise at least 30 minutes per day on most days of the week. Some examples of exercise include walking, biking, dancing, and swimming. You can also fit in more physical activity by taking the stairs instead of the elevator or parking farther away from stores. Ask your healthcare provider about the best exercise plan for you.    © Copyright [employer] 2018 Information is for End User's use only and may not be sold, redistributed or otherwise used for commercial purposes. All illustrations and images included in CareNotes® are the copyrighted property of [employer] or [employer]

## 2025-06-11 NOTE — ASSESSMENT & PLAN NOTE
Pt wishes to try glp-1   zepbound 2.5mg weekly side effects explained N/V/D abd pain muscle mass loss hair  loss gastroparesis

## 2025-06-12 ENCOUNTER — TELEPHONE (OUTPATIENT)
Dept: FAMILY MEDICINE CLINIC | Facility: CLINIC | Age: 75
End: 2025-06-12

## 2025-06-12 RX ORDER — TIRZEPATIDE 2.5 MG/.5ML
2.5 INJECTION, SOLUTION SUBCUTANEOUS WEEKLY
Qty: 2 ML | Refills: 2 | Status: SHIPPED | OUTPATIENT
Start: 2025-06-12 | End: 2025-07-10

## 2025-06-12 NOTE — TELEPHONE ENCOUNTER
PA for  (Zepbound) 2.5 mg/0.5 mL SUBMITTED to Medicare     via      [x][employer]-Case ID # [id number]      [x]PA sent as URGENT    All office notes, labs and other pertaining documents and studies sent. Clinical questions answered. Awaiting determination from insurance company.     Turnaround time for your insurance to make a decision on your Prior Authorization can take 7-21 business days.

## 2025-06-16 NOTE — TELEPHONE ENCOUNTER
Unfortunately there is no alternative  the ones she is referring to are compounded which are not FDA approved

## 2025-06-16 NOTE — TELEPHONE ENCOUNTER
Patient called about the prior approval for Zepbound being denied and wanted to discuss alternatives. Out of pocket cost is too high. She wanted to know PCP's opinion of other means of getting the medication such as Noom, Hers, GOLO, etc. Also asked about other alternative medications that are cheaper.    Patient stated that [employer] messaging is fine. Please advise.

## 2025-06-16 NOTE — TELEPHONE ENCOUNTER
PA for (Zepbound) 2.5 mg/0.5 mL  DENIED    Reason:(Screenshot if applicable)        Message sent to office clinical pool Yes    Denial letter scanned into Media Yes    We can gladly do an appeal but the process can take about 30-60 days to provide determination. Please have the office staff schedule a Peer to Peer at phone [phone]  . If an appeal is truly warranted please have Provider send clinical documentation to the PA department to support the appeal.     **Please follow up with your patient regarding denial and next steps**   [Fatigue] : no fatigue [Decreased Appetite] : appetite not decreased [Recent Weight Gain (___ Lbs)] : no recent weight gain [Recent Weight Loss (___ Lbs)] : no recent weight loss [Visual Field Defect] : no visual field defect [Dry Eyes] : no dryness [Dysphagia] : no dysphagia [Neck Pain] : no neck pain [Dysphonia] : no dysphonia [Nasal Congestion] : no nasal congestion [Chest Pain] : no chest pain [Slow Heart Rate] : heart rate is not slow [Palpitations] : no palpitations [Fast Heart Rate] : heart rate is not fast [Shortness Of Breath] : no shortness of breath [Cough] : no cough [Nausea] : no nausea [Constipation] : no constipation [Vomiting] : no vomiting [Diarrhea] : no diarrhea [Polyuria] : no polyuria [Irregular Menses] : regular menses [Joint Pain] : no joint pain [Muscle Weakness] : no muscle weakness [Acanthosis] : no acanthosis  [Headaches] : no headaches [Acne] : no acne [Dizziness] : no dizziness [Tremors] : no tremors [Depression] : no depression [Polydipsia] : no polydipsia [Easy Bleeding] : no ~M tendency for easy bleeding [Cold Intolerance] : no cold intolerance [Easy Bruising] : no tendency for easy bruising

## 2025-07-06 PROBLEM — Z00.00 MEDICARE ANNUAL WELLNESS VISIT, SUBSEQUENT: Status: RESOLVED | Noted: 2025-06-06 | Resolved: 2025-07-06

## 2025-07-16 ENCOUNTER — TELEPHONE (OUTPATIENT)
Dept: GASTROENTEROLOGY | Facility: CLINIC | Age: 75
End: 2025-07-16

## 2025-07-16 ENCOUNTER — CONSULT (OUTPATIENT)
Dept: GASTROENTEROLOGY | Facility: CLINIC | Age: 75
End: 2025-07-16
Payer: MEDICARE

## 2025-07-16 VITALS
DIASTOLIC BLOOD PRESSURE: 78 MMHG | SYSTOLIC BLOOD PRESSURE: 123 MMHG | HEART RATE: 61 BPM | BODY MASS INDEX: 33.42 KG/M2 | HEIGHT: 61 IN | WEIGHT: 177 LBS

## 2025-07-16 DIAGNOSIS — K21.9 GASTROESOPHAGEAL REFLUX DISEASE, UNSPECIFIED WHETHER ESOPHAGITIS PRESENT: Primary | ICD-10-CM

## 2025-07-16 DIAGNOSIS — K62.5 RECTAL BLEEDING: Primary | ICD-10-CM

## 2025-07-16 DIAGNOSIS — K62.5 RECTAL BLEEDING: ICD-10-CM

## 2025-07-16 PROCEDURE — 99204 OFFICE O/P NEW MOD 45 MIN: CPT | Performed by: INTERNAL MEDICINE

## 2025-07-16 RX ORDER — SODIUM CHLORIDE, SODIUM LACTATE, POTASSIUM CHLORIDE, CALCIUM CHLORIDE 600; 310; 30; 20 MG/100ML; MG/100ML; MG/100ML; MG/100ML
125 INJECTION, SOLUTION INTRAVENOUS CONTINUOUS
OUTPATIENT
Start: 2025-07-16

## 2025-07-16 RX ORDER — SOD SULF/POT CHLORIDE/MAG SULF 1.479 G
12 TABLET ORAL 2 TIMES DAILY
Qty: 24 TABLET | Refills: 0 | Status: SHIPPED | OUTPATIENT
Start: 2025-07-16

## 2025-07-16 RX ORDER — POLYETHYLENE GLYCOL 3350, SODIUM CHLORIDE, SODIUM BICARBONATE, POTASSIUM CHLORIDE 420; 11.2; 5.72; 1.48 G/4L; G/4L; G/4L; G/4L
4000 POWDER, FOR SOLUTION ORAL ONCE
Qty: 4000 ML | Refills: 0 | Status: SHIPPED | OUTPATIENT
Start: 2025-07-16 | End: 2025-07-16

## 2025-07-16 NOTE — PROGRESS NOTES
Name: Trinh Christopher      : 1950      MRN: [id number]  Encounter Provider: Dong Goodwin MD  Encounter Date: 2025   Encounter department: [facility]OLOG[facility] [address]  :  Assessment & Plan  Gastroesophageal reflux disease, unspecified whether esophagitis present  Will plan further evaluation with EGD.  Episodes of regurgitation may be related to hiatal hernia, delayed gastric emptying, etc.  Continue omeprazole.  May consider gastric emptying scan if this is unremarkable.  Orders:    EGD; Future    Rectal bleeding  Will plan further evaluation with colonoscopy.  She is requesting Sutab preparation  Orders:    Colonoscopy; Future    polyethylene glycol-electrolytes (TriLyte) 4000 mL solution; Take 4,000 mL by mouth once for 1 dose Take 4000 mL by mouth once for 1 dose. Use as directed        History of Present Illness     Trinh Christopher is a 74 y.o. female who presents with worsening reflux symptoms.  Has a history of GERD and has been on omeprazole for a number of years.  More recently she has had occasions of acidic regurgitation, sometimes with vomiting, primarily at nighttime which is quite painful with significant burning and subsequent irritation and coughing the following day.  Does describe some mild early satiety.  No dysphagia or odynophagia has a history of rectal bleeding and hemorrhoids status post hemorrhoidal banding with improvement but still occasional rectal bleeding.  Previously was resistant to undergo colonoscopy but now is willing.  HPI  Symptom onset:     Diagnosis:     Year of diagnosis:       IBD Summary:      Extraintestinal Manifestations  IBD-associated arthropathy      Uveitis      Oral aphthous ulcers     Erythema nodosum      Pyoderma gangrenosum      Primary sclerosing cholangitis      Thrombotic complications        During the last 7 days, Trinh experienced the following symptoms:  Unintentional Weight Loss (in the last month)    "  Fever     Eye irritation     Mouth sores     Sore throat     Chest pain     Shortness of breath     Numbness or tingling in hands or feet     Skin rash     Pain or swelling in joints     Bruising or bleeding     Felt depressed or blue     Fatigue     Dysuria       Historical Information   Cancer / Dysplasia History  History of IBD-associated dysplasia:      Date of diagnosis (Year):     History of colorectal cancer:     History of cervical dysplasia:     History of skin cancer:       Surgical History  Number of IBD surgeries:        First IBD surgery:    Most Recent IBD surgery:    Esophageal:       Gastroduodenal:       Small bowel resection(s):       Ileocolonic resection(s):        Colonic resection(s):       Ileostomy or colostomy:       Complete colectomy:          Medications     Year last used Reason for discontinuation   Corticosteroids             Thiopurines             Methotrexate             Infliximab             Adalimumab             Certolizumab             Golimumab             Natalizumab             Mesalamine             Sulfasalzine             Vedolizumab             Ustekinumab             Tofacitinib             Other biologic               Allergies[1]  Objective   /78 (BP Location: Left arm, Patient Position: Sitting, Cuff Size: Standard)   Pulse 61   Ht 5' 1\" (1.549 m)   Wt 80.3 kg (177 lb)   BMI 33.44 kg/m²      Physical Exam  Vitals and nursing note reviewed.   Constitutional:       General: She is not in acute distress.     Appearance: She is not ill-appearing.   HENT:      Head: Normocephalic and atraumatic.     Eyes:      General: No scleral icterus.     Extraocular Movements: Extraocular movements intact.       Cardiovascular:      Rate and Rhythm: Normal rate.   Pulmonary:      Effort: Pulmonary effort is normal. No respiratory distress.     Skin:     General: Skin is warm and dry.      Coloration: Skin is not cyanotic.      Findings: No erythema.     Neurological:      " General: No focal deficit present.      Mental Status: She is alert and oriented to person, place, and time.     Psychiatric:         Mood and Affect: Mood normal.         Behavior: Behavior normal.         Abdominal Exam:  The abdominal exam was notable for   tenderness with   mass.   Perirectal assessment                         Laboratory Data   Most recent (date) Result   PPD       Quanitferon gold       TPMT       Hepatitis A       HBsAb       HBcAb       HBsAg       HCV Ab         Imaging / Diagnostic Procedures   Most recent (date) Findings   Colonoscopy or sigmoidoscopy #1              Ulcers/Erosions              Strictures              Stricture Severity              Endoscopic Score Gramajo Score:    Rutgeert's:               Findings              Pathology      Colonoscopy or sigmoidoscopy #2              Ulcers/Erosions                 Strictures                 Stricture Severity              Endoscopic Score Gramajo Score:    Rugeert's:              Findings              Pathology      EGD       Small bowel follow-through       CT enterography       CT without enterography       MRI enterography       Capsule study       DEXA scan   Lowest Z-score:      CXR (for TB)                [1]   Allergies  Allergen Reactions    Doxycycline GI Intolerance    Tramadol GI Intolerance     Dizzy    Other Rash     Cat gut suture

## 2025-07-16 NOTE — ASSESSMENT & PLAN NOTE
Will plan further evaluation with colonoscopy.  She is requesting Sutab preparation  Orders:    Colonoscopy; Future    polyethylene glycol-electrolytes (TriLyte) 4000 mL solution; Take 4,000 mL by mouth once for 1 dose Take 4000 mL by mouth once for 1 dose. Use as directed

## 2025-07-16 NOTE — TELEPHONE ENCOUNTER
Scheduled date of EGD/colonoscopy (as of today):8/28/25  Physician performing EGD/colonoscopy:Dr Goodwin   Location of EGD/colonoscopy:[facility]  Desired bowel prep reviewed with patient:sutab   Instructions reviewed with patient by:sb  Clearances:  none  Pt given sutab instructions at ov. Sb

## 2025-08-14 ENCOUNTER — ANESTHESIA (OUTPATIENT)
Dept: ANESTHESIOLOGY | Facility: HOSPITAL | Age: 75
End: 2025-08-14

## 2025-08-14 ENCOUNTER — ANESTHESIA EVENT (OUTPATIENT)
Dept: ANESTHESIOLOGY | Facility: HOSPITAL | Age: 75
End: 2025-08-14

## 2025-09-03 ENCOUNTER — 1 DAY POST-OP (OUTPATIENT)
Dept: URBAN - METROPOLITAN AREA CLINIC 6 | Facility: CLINIC | Age: 75
End: 2025-09-03

## 2025-09-03 DIAGNOSIS — Z96.1: ICD-10-CM

## 2025-09-03 PROCEDURE — 99024 POSTOP FOLLOW-UP VISIT: CPT

## 2025-09-03 ASSESSMENT — VISUAL ACUITY
OS_SC: 20/30+2
OU_SC: J1+
OD_SC: 20/40+2
OD_PH: 20/30+2

## 2025-09-03 ASSESSMENT — KERATOMETRY
OS_K1POWER_DIOPTERS: 43.50
OD_AXISANGLE_DEGREES: 8
OD_K2POWER_DIOPTERS: 44.50
OS_K2POWER_DIOPTERS: 44.25
OS_AXISANGLE2_DEGREES: 88
OD_K1POWER_DIOPTERS: 43.50
OS_AXISANGLE_DEGREES: 178
OD_AXISANGLE2_DEGREES: 98

## 2025-09-03 ASSESSMENT — TONOMETRY
OS_IOP_MMHG: 10
OD_IOP_MMHG: 15

## (undated) DEVICE — TUBING AUX CHANNEL

## (undated) DEVICE — 1200CC GUARDIAN II: Brand: GUARDIAN

## (undated) DEVICE — "MH-438 A/W VLVE F/140 EVIS-140": Brand: AIR/WATER VALVE

## (undated) DEVICE — BRUSH ENDO CLEANING DBL-HEADER

## (undated) DEVICE — DISPOSABLE BIOPSY VALVE MAJ-1555: Brand: SINGLE USE BIOPSY VALVE (STERILE)

## (undated) DEVICE — GAUZE SPONGES,16 PLY: Brand: CURITY

## (undated) DEVICE — SOLIDIFIER FLUID WASTE CONTROL 1500ML

## (undated) DEVICE — AIRLIFE™  ADULT CUSHION NASAL CANNULA WITH 7 FOOT (2.1 M) CRUSH-RESISTANT OXYGEN TUBING, AND U/CONNECT-IT ADAPTER: Brand: AIRLIFE™

## (undated) DEVICE — "MH-443 SUCTION VALVE F/EVIS140 EVIS160": Brand: SUCTION VALVE

## (undated) DEVICE — SINGLE-USE BIOPSY FORCEPS: Brand: RADIAL JAW 4

## (undated) DEVICE — "MB-142 MOUTHPIECE": Brand: MOUTHPIECE

## (undated) DEVICE — BITE BLOCK ENDO 60FR ADLT MAXI  DISP W/STRAP

## (undated) DEVICE — GLOVE EXAM NON-STRL NTRL PLUS LRG PF

## (undated) DEVICE — TRAVELKIT CONTAINS FIRST STEP KIT (200ML EP-4 KIT) AND SOILED SCOPE BAG - 1 KIT: Brand: TRAVELKIT CONTAINS FIRST STEP KIT AND SOILED SCOPE BAG

## (undated) DEVICE — TUBING BUBBLE CLEAR 5MM X 100 FT NS

## (undated) DEVICE — "MAJ-901 WATER CONTAINER SET CV-160/140": Brand: WATER CONTAINER

## (undated) DEVICE — LUBRICANT SURGILUBE TUBE 4 OZ  FLIP TOP

## (undated) DEVICE — MEDI-VAC YANKAUER SUCTION HANDLE: Brand: CARDINAL HEALTH

## (undated) DEVICE — 60 ML SYRINGE,REGULAR TIP: Brand: MONOJECT